# Patient Record
Sex: MALE | Race: WHITE | Employment: OTHER | RURAL
[De-identification: names, ages, dates, MRNs, and addresses within clinical notes are randomized per-mention and may not be internally consistent; named-entity substitution may affect disease eponyms.]

---

## 2017-03-15 ENCOUNTER — OFFICE VISIT (OUTPATIENT)
Dept: CARDIOLOGY CLINIC | Age: 77
End: 2017-03-15

## 2017-03-15 VITALS
BODY MASS INDEX: 24.73 KG/M2 | OXYGEN SATURATION: 99 % | SYSTOLIC BLOOD PRESSURE: 110 MMHG | WEIGHT: 167 LBS | HEIGHT: 69 IN | DIASTOLIC BLOOD PRESSURE: 58 MMHG | RESPIRATION RATE: 16 BRPM | HEART RATE: 73 BPM

## 2017-03-15 DIAGNOSIS — E78.5 DYSLIPIDEMIA: ICD-10-CM

## 2017-03-15 DIAGNOSIS — R55 SYNCOPE, UNSPECIFIED SYNCOPE TYPE: ICD-10-CM

## 2017-03-15 DIAGNOSIS — I48.0 PAROXYSMAL ATRIAL FIBRILLATION (HCC): Primary | ICD-10-CM

## 2017-03-15 DIAGNOSIS — I10 ESSENTIAL HYPERTENSION: ICD-10-CM

## 2017-03-15 PROBLEM — F03.90 DEMENTIA (HCC): Status: ACTIVE | Noted: 2017-03-15

## 2017-03-15 RX ORDER — MINERAL OIL
180 ENEMA (ML) RECTAL
COMMUNITY
End: 2019-01-01

## 2017-03-15 RX ORDER — MEMANTINE HYDROCHLORIDE 10 MG/1
TABLET ORAL 2 TIMES DAILY
COMMUNITY
End: 2018-04-14

## 2017-03-15 RX ORDER — RIVASTIGMINE 9.5 MG/24H
1 PATCH, EXTENDED RELEASE TRANSDERMAL DAILY
COMMUNITY
End: 2017-09-20 | Stop reason: ALTCHOICE

## 2017-03-15 RX ORDER — METOPROLOL TARTRATE 25 MG/1
12.5 TABLET, FILM COATED ORAL DAILY
COMMUNITY
End: 2019-01-01

## 2017-03-15 RX ORDER — MULTIVIT WITH MINERALS/HERBS
1 TABLET ORAL DAILY
COMMUNITY
End: 2019-01-01

## 2017-03-15 RX ORDER — TRIAMCINOLONE ACETONIDE 55 UG/1
1 SPRAY, METERED NASAL AS NEEDED
COMMUNITY
End: 2020-01-01

## 2017-03-15 RX ORDER — ATORVASTATIN CALCIUM 40 MG/1
40 TABLET, FILM COATED ORAL DAILY
COMMUNITY
End: 2019-01-01

## 2017-03-15 NOTE — MR AVS SNAPSHOT
Visit Information Date & Time Provider Department Dept. Phone Encounter #  
 3/15/2017  2:20 PM José Miguel Ibarra, [address] [facility] [address]  [phone] [id number] Follow-up Instructions Return in about 6 months (around 9/15/2017). Follow-up and Disposition History Upcoming Health Maintenance Date Due DTaP/Tdap/Td series (1 - Tdap) 5/4/1959 ZOSTER VACCINE AGE 60> 5/4/1998 GLAUCOMA SCREENING Q2Y 5/4/2003 Pneumococcal 65+ Low/Medium Risk (1 of 2 - PCV13) 5/4/2003 MEDICARE YEARLY EXAM 5/4/2003 INFLUENZA AGE 9 TO ADULT 8/1/2016 Allergies as of 3/15/2017  Review Complete On: 3/15/2017 By: José Miguel Ibarra MD  
 No Known Allergies Current Immunizations  Never Reviewed No immunizations on file. Not reviewed this visit You Were Diagnosed With   
  
 Codes Comments Paroxysmal atrial fibrillation (HCC)    -  Primary ICD-10-CM: I48.0 ICD-9-CM: 427.31 Essential hypertension     ICD-10-CM: I10 
ICD-9-CM: 401.9 Dyslipidemia     ICD-10-CM: E78.5 ICD-9-CM: 272.4 Syncope, unspecified syncope type     ICD-10-CM: R55 
ICD-9-CM: 780. 2 Vitals BP Pulse Resp Height(growth percentile) Weight(growth percentile) SpO2  
 110/58 (BP 1 Location: Right arm, BP Patient Position: Sitting) 73 16 5' 9\" (1.753 m) 167 lb (75.8 kg) 99% BMI Smoking Status 24.66 kg/m2 Former Smoker Vitals History BMI and BSA Data Body Mass Index Body Surface Area  
 24.66 kg/m 2 1.92 m 2 Preferred Pharmacy Pharmacy Name Phone [address], [address] AT [facility] [address] Gautam Enriquez [phone] Your Updated Medication List  
  
   
This list is accurate as of: 3/15/17  3:14 PM.  Always use your most recent med list.  
  
  
  
  
 b complex vitamins tablet Take 1 Tab by mouth daily. ELIQUIS 5 mg tablet Generic drug:  apixaban Take 5 mg by mouth two (2) times a day. EXELON 9.5 mg/24 hr patch Generic drug:  rivastigmine 1 Patch by TransDERmal route daily. fexofenadine 180 mg tablet Commonly known as:  Avonne Day Take  by mouth daily. FISH OIL PO Take  by mouth. Takes 1200mg daily LIPITOR 40 mg tablet Generic drug:  atorvastatin Take  by mouth daily. metoprolol tartrate 25 mg tablet Commonly known as:  LOPRESSOR Take  by mouth two (2) times a day. NAMENDA 10 mg tablet Generic drug:  memantine Take  by mouth two (2) times a day. NASACORT 55 mcg nasal inhaler Generic drug:  triamcinolone 1 Spray as needed. * OTHER Turmeric  1000mg. daily * OTHER  
L-Serine 500 mg. daily * Notice: This list has 2 medication(s) that are the same as other medications prescribed for you. Read the directions carefully, and ask your doctor or other care provider to review them with you. We Performed the Following AMB POC EKG ROUTINE W/ 12 LEADS, INTER & REP [51035 CPT(R)] Follow-up Instructions Return in about 6 months (around 9/15/2017). Introducing [facility]! [facility] introduces [employer] patient portal. Now you can access parts of your medical record, email your doctor's office, and request medication refills online. 1. In your internet browser, go to [email or web address] 2. Click on the First Time User? Click Here link in the Sign In box. You will see the New Member Sign Up page. 3. Enter your [employer] Access Code exactly as it appears below. You will not need to use this code after youve completed the sign-up process. If you do not sign up before the expiration date, you must request a new code. · [employer] Access Code: [email or web address] Expires: 6/13/2017  3:14 PM 
 
4. Enter the last four digits of your Social Security Number (xxxx) and Date of Birth (mm/dd/yyyy) as indicated and click Submit.  You will be taken to the next sign-up page. 5. Create a JumpPost ID. This will be your JumpPost login ID and cannot be changed, so think of one that is secure and easy to remember. 6. Create a JumpPost password. You can change your password at any time. 7. Enter your Password Reset Question and Answer. This can be used at a later time if you forget your password. 8. Enter your e-mail address. You will receive e-mail notification when new information is available in [address] 9. Click Sign Up. You can now view and download portions of your medical record. 10. Click the Download Summary menu link to download a portable copy of your medical information. If you have questions, please visit the Frequently Asked Questions section of the JumpPost website. Remember, JumpPost is NOT to be used for urgent needs. For medical emergencies, dial 911. Now available from your iPhone and Android! Please provide this summary of care documentation to your next provider. Your primary care clinician is listed as Daphne Olmstead. If you have any questions after today's visit, please call [phone].

## 2017-03-15 NOTE — PROGRESS NOTES
Katina Flores III is a 64 y.o. male is here for hospital f/u/new pt evaluation. Hx hypertension,dyslipidemia, mild dementia, admitted 2/10/17 to [facility] with syncope after using bathroom, weak shaky then recurrence walking back to bed--to ER. In afib/RVR and mildly dehydrated--controlled with IV to po Cardizem, metoprolol and gentle hydration, meds adjusted. Cardiac markers neg. Echo with LVEF 60-65, mild AV sclerosis/mild AI, mild MR. Doing well since d/c without recurrence. The patient denies chest pain/ shortness of breath, orthopnea, PND, LE edema, palpitations, syncope, presyncope or fatigue. Patient Active Problem List    Diagnosis Date Noted    PAF (paroxysmal atrial fibrillation) ([facility] [address] 03/15/2017    Syncope 03/15/2017    Essential hypertension 03/15/2017    Dyslipidemia 03/15/2017    Dementia 03/15/2017      Denita Breath  Past Medical History:   Diagnosis Date    Allergic rhinitis     Dementia     Dyslipidemia     HTN (hypertension)     Paroxysmal atrial fibrillation (HCC)     Syncope       History reviewed. No pertinent surgical history. No Known Allergies   Family History   Problem Relation Age of Onset    Parkinson's Disease Mother       Social History     Social History    Marital status:      Spouse name: N/A    Number of children: N/A    Years of education: N/A     Occupational History    Not on file. Social History Main Topics    Smoking status: Not on file    Smokeless tobacco: Not on file    Alcohol use Not on file    Drug use: Not on file    Sexual activity: Not on file     Other Topics Concern    Not on file     Social History Narrative    No narrative on file      No current outpatient prescriptions on file. No current facility-administered medications for this visit. Review of Symptoms:    CONST  No weight change.  No fever, chills, sweats    ENT No visual changes, URI sx, sore throat    CV  See HPI   RESP  No cough, or sputum, wheezing. Also see HPI   GI  No abdominal pain or change in bowel habits. No heartburn or dysphagia. No melena or rectal bleeding.   No dysuria, urgency, frequency, hematuria   MSKEL  No joint pain, swelling. No muscle pain. SKIN  No rash or lesions. NEURO  No headache, syncope, or seizure. No weakness, loss of sensation, or paresthesias. PSYCH  No low mood or depression  No anxiety. HE/LYMPH  No easy bruising, abnormal bleeding, or enlarged glands. Physical ExamPhysical Exam:    Visit Vitals    Resp 16    Ht 5' 9\" (1.753 m)    Wt 167 lb (75.8 kg)    BMI 24.66 kg/m2     Gen: NAD  HEENT:  PERRL, throat clear  Neck: no mass or thyromegaly, no JVD   Heart:  sl irregular,Nl S1S2,  no murmur, gallop or rub.   Lungs:  clear  Abdomen:   Soft, non-tender, bowel sounds are active.   Extremities:  No edema  Pulse: symmetric  Neuro: A&O times 3, WNL      Cardiographics    ECG: NSR, PVC's, LAE, PRWP      Labs:   No results found for: NA, K, CL, CO2, AGAP, GLU, BUN, CREA, BUCR, GFRAA, GFRNA, CA, TBIL, TBILI, GPT, SGOT, AP, TP, ALB, GLOB, AGRAT, ALT  No results found for: CPK, CPKX, CPX  No results found for: CHOL, CHOLX, CHLST, CHOLV, 036239, HDL, LDL, DLDL, LDLC, DLDLP, TGL, TGLX, TRIGL, [id number], TRIGP, CHHD, CHHDX  No results found for this or any previous visit. Assessment:         Patient Active Problem List    Diagnosis Date Noted    PAF (paroxysmal atrial fibrillation) ([facility] [address] 03/15/2017    Syncope 03/15/2017    Essential hypertension 03/15/2017    Dyslipidemia 03/15/2017    Dementia 03/15/2017      Hx hypertension,dyslipidemia, mild dementia, admitted 2/10/17 to [facility] with syncope after using bathroom, weak shaky then recurrence walking back to bed--to ER. In afib/RVR and mildly dehydrated--controlled with IV to po Cardizem, metoprolol and gentle hydration, meds adjusted. Cardiac markers neg. Echo with LVEF 60-65, mild AV sclerosis/mild AI, mild MR.   Doing well since d/c without recurrence. Plan:     Doing well with no adverse cardiac symptoms. Lipids and labs followed by PCP. Continue current care and f/u in 6 months.     Areli Galicia MD

## 2017-09-20 ENCOUNTER — OFFICE VISIT (OUTPATIENT)
Dept: CARDIOLOGY CLINIC | Age: 77
End: 2017-09-20

## 2017-09-20 VITALS
SYSTOLIC BLOOD PRESSURE: 102 MMHG | OXYGEN SATURATION: 99 % | HEIGHT: 69 IN | DIASTOLIC BLOOD PRESSURE: 60 MMHG | WEIGHT: 167 LBS | RESPIRATION RATE: 18 BRPM | BODY MASS INDEX: 24.73 KG/M2 | HEART RATE: 76 BPM

## 2017-09-20 DIAGNOSIS — I10 ESSENTIAL HYPERTENSION: ICD-10-CM

## 2017-09-20 DIAGNOSIS — I48.0 PAF (PAROXYSMAL ATRIAL FIBRILLATION) (HCC): Primary | ICD-10-CM

## 2017-09-20 RX ORDER — RIVASTIGMINE 13.3 MG/24H
1 PATCH, EXTENDED RELEASE TRANSDERMAL DAILY
COMMUNITY
End: 2020-01-01

## 2017-09-20 NOTE — PROGRESS NOTES
Verified patient with two patient identifiers. Medications reviewed/approved by Dr. Varinder Clark. Verbal from Dr. Varinder Clark to remove the medications that were deleted during the visit.

## 2017-09-20 NOTE — PROGRESS NOTES
Rober Arnold III is a 76 y.o. male is here for routine f/u. Over few weeks, some episodes of N/ feeling quesy, some palpitations  Have Fit-Bit and variable HR . They have moved to [place], still have house here. The patient denies chest pain/ shortness of breath, orthopnea, PND, LE edema,  syncope, presyncope or fatigue. Patient Active Problem List    Diagnosis Date Noted    PAF (paroxysmal atrial fibrillation) ([facility] [address] 03/15/2017    Syncope 03/15/2017    Essential hypertension 03/15/2017    Dyslipidemia 03/15/2017    Dementia 03/15/2017      Zaina Adams MD  Past Medical History:   Diagnosis Date    Allergic rhinitis     Dementia     Dyslipidemia     HTN (hypertension)     Paroxysmal atrial fibrillation (HCC)     Syncope       No past surgical history on file. No Known Allergies   Family History   Problem Relation Age of Onset    Parkinson's Disease Mother       Social History     Social History    Marital status:      Spouse name: N/A    Number of children: N/A    Years of education: N/A     Occupational History    Not on file. Social History Main Topics    Smoking status: Former Smoker     Quit date: 1/1/1963    Smokeless tobacco: Not on file    Alcohol use Not on file    Drug use: Not on file    Sexual activity: Not on file     Other Topics Concern    Not on file     Social History Narrative      Current Outpatient Prescriptions   Medication Sig    rivastigmine (EXELON) 13.3 mg/24 hour patch 1 Patch by TransDERmal route daily.  metoprolol tartrate (LOPRESSOR) 25 mg tablet Take  by mouth two (2) times a day.  apixaban (ELIQUIS) 5 mg tablet Take 5 mg by mouth two (2) times a day.  memantine (NAMENDA) 10 mg tablet Take  by mouth two (2) times a day.  atorvastatin (LIPITOR) 40 mg tablet Take  by mouth daily.  fexofenadine (ALLEGRA) 180 mg tablet Take  by mouth daily.  triamcinolone (NASACORT) 55 mcg nasal inhaler 1 Spray as needed.     b complex vitamins tablet Take 1 Tab by mouth daily.  DOCOSAHEXANOIC ACID/EPA (FISH OIL PO) Take  by mouth. Takes 1200mg daily    OTHER Turmeric  1000mg. daily    OTHER L-Serine 500 mg. daily     No current facility-administered medications for this visit. Review of Symptoms:    CONST  No weight change. No fever, chills, sweats    ENT No visual changes, URI sx, sore throat    CV  See HPI   RESP  No cough, or sputum, wheezing. Also see HPI   GI  No abdominal pain or change in bowel habits. No heartburn or dysphagia. No melena or rectal bleeding.   No dysuria, urgency, frequency, hematuria   MSKEL  No joint pain, swelling. No muscle pain. SKIN  No rash or lesions. NEURO  No headache, syncope, or seizure. No weakness, loss of sensation, or paresthesias. PSYCH  No low mood or depression  No anxiety. HE/LYMPH  No easy bruising, abnormal bleeding, or enlarged glands. Physical ExamPhysical Exam:    Visit Vitals    /60 (BP 1 Location: Right arm, BP Patient Position: Sitting)    Pulse 76    Resp 18    Ht 5' 9\" (1.753 m)    Wt 167 lb (75.8 kg)    SpO2 99%    BMI 24.66 kg/m2     Gen: NAD  HEENT:  PERRL, throat clear  Neck: no adenopathy, no thyromegaly, no JVD   Heart:  irregular,Nl S1S2,  no murmur, gallop or rub.   Lungs:  clear  Abdomen:   Soft, non-tender, bowel sounds are active.   Extremities:  No edema  Pulse: symmetric  Neuro: A&O times 3, No focal neuro deficits    Cardiographics    ECG: afib 102, no acute changes      Assessment:         Patient Active Problem List    Diagnosis Date Noted    PAF (paroxysmal atrial fibrillation) (HCC) 03/15/2017    Syncope 03/15/2017    Essential hypertension 03/15/2017    Dyslipidemia 03/15/2017    Dementia 03/15/2017     Over few weeks, some episodes of N/ feeling quesy, some palpitations  Have Fit-Bit and variable HR . They have moved to [facility], still have house here.      Plan:     Overall relatively stable, intermittent afib--rate controlled on anticoag. Lipids and labs followed by PCP. Continue current care and f/u in 6 months.     Gabe Ledezma MD

## 2018-04-14 ENCOUNTER — HOSPITAL ENCOUNTER (EMERGENCY)
Age: 78
Discharge: HOME | End: 2018-04-14
Attending: EMERGENCY MEDICINE
Payer: MEDICARE

## 2018-04-14 ENCOUNTER — APPOINTMENT (OUTPATIENT)
Dept: CT IMAGING | Age: 78
End: 2018-04-14
Attending: NURSE PRACTITIONER
Payer: MEDICARE

## 2018-04-14 ENCOUNTER — APPOINTMENT (OUTPATIENT)
Dept: GENERAL RADIOLOGY | Age: 78
End: 2018-04-14
Attending: NURSE PRACTITIONER
Payer: MEDICARE

## 2018-04-14 VITALS
HEIGHT: 70 IN | OXYGEN SATURATION: 100 % | BODY MASS INDEX: 24.48 KG/M2 | WEIGHT: 171 LBS | TEMPERATURE: 97.8 F | RESPIRATION RATE: 16 BRPM | HEART RATE: 97 BPM

## 2018-04-14 DIAGNOSIS — S01.01XA LACERATION OF SCALP, INITIAL ENCOUNTER: Primary | ICD-10-CM

## 2018-04-14 DIAGNOSIS — S20.211A CONTUSION OF RIB ON RIGHT SIDE, INITIAL ENCOUNTER: ICD-10-CM
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
## 2018-04-14 PROCEDURE — 70486 CT MAXILLOFACIAL W/O DYE: CPT

## 2018-04-14 PROCEDURE — 70450 CT HEAD/BRAIN W/O DYE: CPT

## 2018-04-14 PROCEDURE — 77030008460 HC STPLR SKN PRECIS 3M -A

## 2018-04-14 PROCEDURE — 74011000250 HC RX REV CODE- 250: Performed by: NURSE PRACTITIONER

## 2018-04-14 PROCEDURE — 71101 X-RAY EXAM UNILAT RIBS/CHEST: CPT

## 2018-04-14 PROCEDURE — 93005 ELECTROCARDIOGRAM TRACING: CPT

## 2018-04-14 PROCEDURE — 77030018836 HC SOL IRR NACL ICUM -A

## 2018-04-14 PROCEDURE — 90471 IMMUNIZATION ADMIN: CPT

## 2018-04-14 PROCEDURE — 74011250637 HC RX REV CODE- 250/637: Performed by: NURSE PRACTITIONER

## 2018-04-14 PROCEDURE — 75810000293 HC SIMP/SUPERF WND  RPR

## 2018-04-14 PROCEDURE — 72125 CT NECK SPINE W/O DYE: CPT

## 2018-04-14 PROCEDURE — 90715 TDAP VACCINE 7 YRS/> IM: CPT | Performed by: NURSE PRACTITIONER

## 2018-04-14 PROCEDURE — 74011250636 HC RX REV CODE- 250/636: Performed by: NURSE PRACTITIONER

## 2018-04-14 PROCEDURE — 99285 EMERGENCY DEPT VISIT HI MDM: CPT

## 2018-04-14 RX ORDER — ONDANSETRON 4 MG/1
4 TABLET, ORALLY DISINTEGRATING ORAL
Status: COMPLETED | OUTPATIENT
Start: 2018-04-14 | End: 2018-04-14

## 2018-04-14 RX ORDER — CLINDAMYCIN HYDROCHLORIDE 300 MG/1
300 CAPSULE ORAL 4 TIMES DAILY
Qty: 40 CAP | Refills: 0 | Status: SHIPPED | OUTPATIENT
Start: 2018-04-14 | End: 2018-04-24

## 2018-04-14 RX ORDER — LIDOCAINE HYDROCHLORIDE AND EPINEPHRINE 10; 10 MG/ML; UG/ML
20 INJECTION, SOLUTION INFILTRATION; PERINEURAL ONCE
Status: COMPLETED | OUTPATIENT
Start: 2018-04-14 | End: 2018-04-14

## 2018-04-14 RX ORDER — IBUPROFEN 800 MG/1
800 TABLET ORAL
Qty: 20 TAB | Refills: 0 | Status: SHIPPED | OUTPATIENT
Start: 2018-04-14 | End: 2018-04-21

## 2018-04-14 RX ORDER — HYDROCODONE BITARTRATE AND ACETAMINOPHEN 5; 325 MG/1; MG/1
1 TABLET ORAL
Status: COMPLETED | OUTPATIENT
Start: 2018-04-14 | End: 2018-04-14

## 2018-04-14 RX ADMIN — ONDANSETRON 4 MG: 4 TABLET, ORALLY DISINTEGRATING ORAL at 08:54

## 2018-04-14 RX ADMIN — HYDROCODONE BITARTRATE AND ACETAMINOPHEN 1 TABLET: 5; 325 TABLET ORAL at 09:59

## 2018-04-14 RX ADMIN — LIDOCAINE HYDROCHLORIDE AND EPINEPHRINE 200 MG: 10; 10 INJECTION, SOLUTION INFILTRATION; PERINEURAL at 11:09

## 2018-04-14 RX ADMIN — TETANUS TOXOID, REDUCED DIPHTHERIA TOXOID AND ACELLULAR PERTUSSIS VACCINE, ADSORBED 0.5 ML: 5; 2.5; 8; 8; 2.5 SUSPENSION INTRAMUSCULAR at 09:56

## 2018-04-14 NOTE — ED TRIAGE NOTES
Per EMS patient fell in the shower when the wife was trying to get him out of the bathroom. Per wife patient did not lose consciousness. Patient has dementia. Patient suffered laceration to his head. Periorbital bruising to left eye noted as well.

## 2018-04-14 NOTE — ED PROVIDER NOTES
HPI Comments: 8:25 AM Evelia Earl III is a 76 y.o. male with a hx of dementia, HTN, and a-fib who presents to the ED for evaluation of left sided facial pain that started PTA. The pt was feeling nauseated this morning, so he went to the bathroom. His wife heard a bang from the bathroom and came in to find her  on the floor. He hit his head on the bathtub. He did not lose consciousness per his wife. His last tetanus is unknown. He has dementia, but he lives at home with his wife who cares for him. He is on Eliquis. The pt has no complaints at this time. Hx limited due to patient dementia. The history is provided by the patient and the spouse. The history is limited by the condition of the patient (Pt has dementia). Past Medical History:   Diagnosis Date    Allergic rhinitis     Dementia     Dyslipidemia     HTN (hypertension)     Paroxysmal atrial fibrillation (HCC)     Syncope        No past surgical history on file. Family History:   Problem Relation Age of Onset    Parkinson's Disease Mother        Social History     Social History    Marital status:      Spouse name: N/A    Number of children: N/A    Years of education: N/A     Occupational History    Not on file. Social History Main Topics    Smoking status: Former Smoker     Quit date: 1/1/1963    Smokeless tobacco: Not on file    Alcohol use Not on file    Drug use: Not on file    Sexual activity: Not on file     Other Topics Concern    Not on file     Social History Narrative         ALLERGIES: Review of patient's allergies indicates no known allergies. Review of Systems   Unable to perform ROS: Dementia   Constitutional: Negative. HENT: Negative. Eyes: Negative. Respiratory: Negative. Cardiovascular: Negative. Gastrointestinal: Negative. Endocrine: Negative. Genitourinary: Negative. Musculoskeletal: Negative.          Right lateral rib pain     Skin: Positive for wound (Left scalp laceration). Allergic/Immunologic: Negative. Neurological: Negative. Hematological: Negative. Psychiatric/Behavioral: Negative. There were no vitals filed for this visit. Physical Exam   Constitutional: He is oriented to person, place, and time. He appears well-developed and well-nourished. No distress. HENT:   Head: Normocephalic and atraumatic. Eyes: Pupils are equal, round, and reactive to light. Neck: Normal range of motion. Neck supple. Cardiovascular: Normal rate, regular rhythm, normal heart sounds and intact distal pulses. Pulmonary/Chest: Effort normal and breath sounds normal. He has no wheezes. He has no rales. TTP on right lateral lower rib cage. No gross or palpable deformity. Abdominal: Soft. Bowel sounds are normal. There is no tenderness. There is no rebound and no guarding. Genitourinary:   Genitourinary Comments: NE   Musculoskeletal: Normal range of motion. Neurological: He is alert and oriented to person, place, and time. No cranial nerve deficit. Coordination normal.   Skin: Skin is warm and dry. There is a six cm laceration to the left parietal scalp. No FB. No bleeding. No tendon, ligament or nerve damage noted. Psychiatric: He has a normal mood and affect. Nursing note and vitals reviewed. MDM  Number of Diagnoses or Management Options  Contusion of rib on right side, initial encounter:   Fall, initial encounter:   Laceration of scalp, initial encounter:   Diagnosis management comments: MDM:  Will obtain CT scan of neck, brain and face. Will also image ribvs via plain film.   Milana Renteria NP  11:45 AM           Amount and/or Complexity of Data Reviewed  Tests in the radiology section of CPT®: ordered and reviewed          ED Course       Wound Closure by Adhesive  Date/Time: 4/14/2018 11:45 AM  Performed by: Martha Del Toro  Authorized by: Martha Del Toro     Consent:     Consent obtained:  Verbal and emergent situation    Consent given by:  Patient and spouse    Risks discussed:  Pain  Anesthesia (see MAR for exact dosages): Anesthesia method:  Local infiltration    Local anesthetic:  Lidocaine 1% WITH epi  Laceration details:     Location:  Scalp    Length (cm):  6    Depth (mm):  4  Repair type:     Repair type:  Simple  Pre-procedure details:     Preparation:  Patient was prepped and draped in usual sterile fashion  Exploration:     Wound exploration: entire depth of wound probed and visualized      Wound extent: no foreign bodies/material noted      Contaminated: no    Treatment:     Area cleansed with:  Betadine    Amount of cleaning:  Standard    Irrigation solution:  Sterile saline    Irrigation method:  Syringe  Skin repair:     Repair method:  Staples    Number of staples:  6  Approximation:     Approximation:  Close    Vermilion border: well-aligned    Post-procedure details:     Dressing:  Sterile dressing               SCRIBE ATTESTATION STATEMENT  Documented by: Linnea loganibing for, and in the presence of, Gillermo Meigs, NP 8:35 AM     Signed by: Keya Patton, 04/14/18 8:35 AM     PROVIDER ATTESTATION STATEMENT  I personally performed the services described in the documentation, reviewed the documentation, as recorded by the scribe in my presence, and it accurately and completely records my words and actions. Gillermo Meigs, NP    Diagnosis:   1. Laceration of scalp, initial encounter    2. Contusion of rib on right side, initial encounter    3. Fall, initial encounter          Disposition:   Discharged to Home. Follow-up Information     Follow up With Details Comments Contact Info    Meme Jackson MD Call on Monday for a follow up appointment. [facility]  [phone]            Patient's Medications   Start Taking    No medications on file   Continue Taking    APIXABAN (ELIQUIS) 5 MG TABLET    Take 5 mg by mouth two (2) times a day.     ATORVASTATIN (LIPITOR) 40 MG TABLET    Take  by mouth daily. B COMPLEX VITAMINS TABLET    Take 1 Tab by mouth daily. DOCOSAHEXANOIC ACID/EPA (FISH OIL PO)    Take  by mouth. Takes 1200mg daily    FEXOFENADINE (ALLEGRA) 180 MG TABLET    Take  by mouth daily. METOPROLOL TARTRATE (LOPRESSOR) 25 MG TABLET    Take  by mouth two (2) times a day. OTHER    Turmeric  1000mg. daily    OTHER    L-Serine 500 mg. daily    RIVASTIGMINE (EXELON) 13.3 MG/24 HOUR PATCH    1 Patch by TransDERmal route daily. TRIAMCINOLONE (NASACORT) 55 MCG NASAL INHALER    1 Spray as needed. These Medications have changed    No medications on file   Stop Taking    MEMANTINE (NAMENDA) 10 MG TABLET    Take  by mouth two (2) times a day.

## 2018-04-14 NOTE — DISCHARGE INSTRUCTIONS
Chest Contusion: Care Instructions  Your Care Instructions  A chest contusion, or bruise, is caused by a fall or direct blow to the chest. Car crashes, falls, getting punched, and injury from bicycle handlebars are common causes of chest contusions. A very forceful blow to the chest can injure the heart or blood vessels in the chest, the lungs, the airway, the liver, or the spleen. Pain may be caused by an injury to muscles, cartilage, or ribs. Deep breathing, coughing, or sneezing can increase your pain. Lying on the injured area also can cause pain. Follow-up care is a key part of your treatment and safety. Be sure to make and go to all appointments, and call your doctor if you are having problems. It's also a good idea to know your test results and keep a list of the medicines you take. How can you care for yourself at home? · Rest and protect the injured or sore area. Stop, change, or take a break from any activity that may be causing your pain. · Put ice or a cold pack on the area for 10 to 20 minutes at a time. Put a thin cloth between the ice and your skin. · After 2 or 3 days, if your swelling is gone, apply a heating pad set on low or a warm cloth to your chest. Some doctors suggest that you go back and forth between hot and cold. Put a thin cloth between the heating pad and your skin. · Do not wrap or tape your ribs for support. This may cause you to take smaller breaths, which could increase your risk of pneumonia and lung collapse. · Ask your doctor if you can take an over-the-counter pain medicine, such as acetaminophen (Tylenol), ibuprofen (Advil, Motrin), or naproxen (Aleve). Be safe with medicines. Read and follow all instructions on the label. · Take your medicines exactly as prescribed. Call your doctor if you think you are having a problem with your medicine.   · Gentle stretching and massage may help you feel better after a few days of rest. Stretch slowly to the point just before discomfort begins, then hold the stretch for at least 15 to 30 seconds. Do this 3 or 4 times per day. · As your pain gets better, slowly return to your normal activities. Be patient, because chest bruises can take weeks or months to heal. Any increased pain may be a sign that you need to rest a while longer. When should you call for help? Call 911 anytime you think you may need emergency care. For example, call if:  ? · You have severe trouble breathing. ? · You cough up blood. ?Call your doctor now or seek immediate medical care if:  ? · You have belly pain. ? · You are dizzy or lightheaded, or you feel like you may faint. ? · You develop new symptoms with the chest pain. ? · Your chest pain gets worse. ? · You have a fever. ? · You have some shortness of breath. ? · You have a cough that brings up mucus from the lungs. ? Watch closely for changes in your health, and be sure to contact your doctor if:  ? · Your chest pain is not improving after 1 week. Where can you learn more? Go to http://mehul-donavan.info/. Enter I174 in the search box to learn more about \"Chest Contusion: Care Instructions. \"  Current as of: March 20, 2017  Content Version: 11.4  © 3861-9453 [employer]. Care instructions adapted under license by [employer] (which disclaims liability or warranty for this information). If you have questions about a medical condition or this instruction, always ask your healthcare professional. [place] [address] any warranty or liability for your use of this information. Audible Magic Activation    Thank you for requesting access to Audible Magic. Please follow the instructions below to securely access and download your online medical record. Audible Magic allows you to send messages to your doctor, view your test results, renew your prescriptions, schedule appointments, and more. How Do I Sign Up? 1.  In your internet browser, go to [email or web address]  2. Click on the First Time User? Click Here link in the Sign In box. You will be redirect to the New Member Sign Up page. 3. Enter your iClinical Access Code exactly as it appears below. You will not need to use this code after youve completed the sign-up process. If you do not sign up before the expiration date, you must request a new code. iClinical Access Code: [email or web address]  Expires: 2018  8:19 AM (This is the date your iClinical access code will )    4. Enter the last four digits of your Social Security Number (xxxx) and Date of Birth (mm/dd/yyyy) as indicated and click Submit. You will be taken to the next sign-up page. 5. Create a Alliquat ID. This will be your iClinical login ID and cannot be changed, so think of one that is secure and easy to remember. 6. Create a iClinical password. You can change your password at any time. 7. Enter your Password Reset Question and Answer. This can be used at a later time if you forget your password. 8. Enter your e-mail address. You will receive e-mail notification when new information is available in [address] 9. Click Sign Up. You can now view and download portions of your medical record. 10. Click the Download Summary menu link to download a portable copy of your medical information. Additional Information    If you have questions, please visit the Frequently Asked Questions section of the iClinical website at [email or web address] Remember, iClinical is NOT to be used for urgent needs. For medical emergencies, dial 911. Recommend wound check in 48 hours. Staples to come out in 10 days. Return to the ER at once if symptoms worsen. May take over the counter Tylenol as needed for pain. I have reviewed discharge instructions with the patient. The patient verbalized understanding.

## 2018-04-15 LAB
ATRIAL RATE: 375 BPM
CALCULATED R AXIS, ECG10: -34 DEGREES
CALCULATED T AXIS, ECG11: -28 DEGREES
DIAGNOSIS, 93000: NORMAL
Q-T INTERVAL, ECG07: 414 MS
QRS DURATION, ECG06: 94 MS
QTC CALCULATION (BEZET), ECG08: 468 MS
VENTRICULAR RATE, ECG03: 77 BPM

## 2018-08-24 ENCOUNTER — HOSPITAL ENCOUNTER (EMERGENCY)
Age: 78
Discharge: HOME | End: 2018-08-24
Attending: EMERGENCY MEDICINE
Payer: MEDICARE

## 2018-08-24 ENCOUNTER — APPOINTMENT (OUTPATIENT)
Dept: CT IMAGING | Age: 78
End: 2018-08-24
Attending: EMERGENCY MEDICINE
Payer: MEDICARE

## 2018-08-24 VITALS
HEART RATE: 66 BPM | BODY MASS INDEX: 24.77 KG/M2 | OXYGEN SATURATION: 100 % | DIASTOLIC BLOOD PRESSURE: 87 MMHG | RESPIRATION RATE: 12 BRPM | HEIGHT: 70 IN | WEIGHT: 173 LBS | SYSTOLIC BLOOD PRESSURE: 149 MMHG | TEMPERATURE: 98.4 F

## 2018-08-24 DIAGNOSIS — R55 NEAR SYNCOPE: Primary | ICD-10-CM

## 2018-08-24 LAB
ALBUMIN SERPL-MCNC: 3.8 G/DL (ref 3.4–5)
ALBUMIN/GLOB SERPL: 1.1 {RATIO} (ref 0.8–1.7)
ALP SERPL-CCNC: 67 U/L (ref 45–117)
ALT SERPL-CCNC: 28 U/L (ref 16–61)
ANION GAP SERPL CALC-SCNC: 7 MMOL/L (ref 3–18)
APTT PPP: 32.6 SEC (ref 23–36.4)
AST SERPL-CCNC: 20 U/L (ref 15–37)
BASOPHILS # BLD: 0 K/UL (ref 0–0.1)
BASOPHILS NFR BLD: 0 % (ref 0–2)
BILIRUB SERPL-MCNC: 0.8 MG/DL (ref 0.2–1)
BUN SERPL-MCNC: 24 MG/DL (ref 7–18)
BUN/CREAT SERPL: 22 (ref 12–20)
CALCIUM SERPL-MCNC: 8.8 MG/DL (ref 8.5–10.1)
CHLORIDE SERPL-SCNC: 106 MMOL/L (ref 100–108)
CO2 SERPL-SCNC: 27 MMOL/L (ref 21–32)
CREAT SERPL-MCNC: 1.09 MG/DL (ref 0.6–1.3)
DIFFERENTIAL METHOD BLD: ABNORMAL
EOSINOPHIL # BLD: 0.2 K/UL (ref 0–0.4)
EOSINOPHIL NFR BLD: 2 % (ref 0–5)
ERYTHROCYTE [DISTWIDTH] IN BLOOD BY AUTOMATED COUNT: 12.8 % (ref 11.6–14.5)
GLOBULIN SER CALC-MCNC: 3.6 G/DL (ref 2–4)
GLUCOSE SERPL-MCNC: 133 MG/DL (ref 74–99)
HCT VFR BLD AUTO: 39.9 % (ref 36–48)
HGB BLD-MCNC: 13.4 G/DL (ref 13–16)
INR PPP: 1.2 (ref 0.8–1.2)
LYMPHOCYTES # BLD: 1.4 K/UL (ref 0.9–3.6)
LYMPHOCYTES NFR BLD: 19 % (ref 21–52)
MCH RBC QN AUTO: 32.6 PG (ref 24–34)
MCHC RBC AUTO-ENTMCNC: 33.6 G/DL (ref 31–37)
MCV RBC AUTO: 97.1 FL (ref 74–97)
MONOCYTES # BLD: 0.5 K/UL (ref 0.05–1.2)
MONOCYTES NFR BLD: 7 % (ref 3–10)
NEUTS SEG # BLD: 5.1 K/UL (ref 1.8–8)
NEUTS SEG NFR BLD: 72 % (ref 40–73)
PLATELET # BLD AUTO: 238 K/UL (ref 135–420)
PMV BLD AUTO: 10.3 FL (ref 9.2–11.8)
POTASSIUM SERPL-SCNC: 4.4 MMOL/L (ref 3.5–5.5)
PROT SERPL-MCNC: 7.4 G/DL (ref 6.4–8.2)
PROTHROMBIN TIME: 14.8 SEC (ref 11.5–15.2)
RBC # BLD AUTO: 4.11 M/UL (ref 4.7–5.5)
SODIUM SERPL-SCNC: 140 MMOL/L (ref 136–145)
WBC # BLD AUTO: 7.2 K/UL (ref 4.6–13.2)

## 2018-08-24 PROCEDURE — 85730 THROMBOPLASTIN TIME PARTIAL: CPT | Performed by: EMERGENCY MEDICINE

## 2018-08-24 PROCEDURE — 93005 ELECTROCARDIOGRAM TRACING: CPT

## 2018-08-24 PROCEDURE — 51798 US URINE CAPACITY MEASURE: CPT

## 2018-08-24 PROCEDURE — 99285 EMERGENCY DEPT VISIT HI MDM: CPT

## 2018-08-24 PROCEDURE — 85610 PROTHROMBIN TIME: CPT | Performed by: EMERGENCY MEDICINE

## 2018-08-24 PROCEDURE — 80053 COMPREHEN METABOLIC PANEL: CPT | Performed by: EMERGENCY MEDICINE

## 2018-08-24 PROCEDURE — 70450 CT HEAD/BRAIN W/O DYE: CPT

## 2018-08-24 PROCEDURE — 85025 COMPLETE CBC W/AUTO DIFF WBC: CPT | Performed by: EMERGENCY MEDICINE

## 2018-08-24 NOTE — DISCHARGE INSTRUCTIONS
Lightheadedness or Faintness: Care Instructions  Your Care Instructions  Lightheadedness is a feeling that you are about to faint or \"pass out. \" You do not feel as if you or your surroundings are moving. It is different from vertigo, which is the feeling that you or things around you are spinning or tilting. Lightheadedness usually goes away or gets better when you lie down. If lightheadedness gets worse, it can lead to a fainting spell. It is common to feel lightheaded from time to time. Lightheadedness usually is not caused by a serious problem. It often is caused by a short-lasting drop in blood pressure and blood flow to your head that occurs when you get up too quickly from a seated or lying position. Follow-up care is a key part of your treatment and safety. Be sure to make and go to all appointments, and call your doctor if you are having problems. It's also a good idea to know your test results and keep a list of the medicines you take. How can you care for yourself at home? · Lie down for 1 or 2 minutes when you feel lightheaded. After lying down, sit up slowly and remain sitting for 1 to 2 minutes before slowly standing up. · Avoid movements, positions, or activities that have made you lightheaded in the past.  · Get plenty of rest, especially if you have a cold or flu, which can cause lightheadedness. · Make sure you drink plenty of fluids, especially if you have a fever or have been sweating. · Do not drive or put yourself and others in danger while you feel lightheaded. When should you call for help? Call 911 anytime you think you may need emergency care. For example, call if:    · You have symptoms of a stroke. These may include:  ¨ Sudden numbness, tingling, weakness, or loss of movement in your face, arm, or leg, especially on only one side of your body. ¨ Sudden vision changes. ¨ Sudden trouble speaking. ¨ Sudden confusion or trouble understanding simple statements.   ¨ Sudden problems with walking or balance. ¨ A sudden, severe headache that is different from past headaches.     · You have symptoms of a heart attack. These may include:  ¨ Chest pain or pressure, or a strange feeling in the chest.  ¨ Sweating. ¨ Shortness of breath. ¨ Nausea or vomiting. ¨ Pain, pressure, or a strange feeling in the back, neck, jaw, or upper belly or in one or both shoulders or arms. ¨ Lightheadedness or sudden weakness. ¨ A fast or irregular heartbeat. After you call 911, the  may tell you to chew 1 adult-strength or 2 to 4 low-dose aspirin. Wait for an ambulance. Do not try to drive yourself.    Watch closely for changes in your health, and be sure to contact your doctor if:    · Your lightheadedness gets worse or does not get better with home care. Where can you learn more? Go to http://mehul-donavan.info/. Enter D065 in the search box to learn more about \"Lightheadedness or Faintness: Care Instructions. \"  Current as of: November 20, 2017  Content Version: 11.7  © 1576-6424 [employer]. Care instructions adapted under license by [employer] (which disclaims liability or warranty for this information). If you have questions about a medical condition or this instruction, always ask your healthcare professional. [address] any warranty or liability for your use of this information. I have reviewed discharge instructions with the patient. The patient verbalized understanding.

## 2018-08-24 NOTE — ED PROVIDER NOTES
EMERGENCY DEPARTMENT HISTORY AND PHYSICAL EXAM    2:46 PM      Date: 8/24/2018  Patient Name: Madeline Juarez III    History of Presenting Illness     Chief Complaint   Patient presents with    Dizziness         History Provided By: Patient, Patient's Wife and EMS    Chief Complaint: weakness, generalized  Duration:  Minutes  Timing:  Acute and Improving  Location: Generalized  Quality: Weakness  Severity: Moderate  Modifying Factors: None  Associated Symptoms: feeling woozy, eyes rolling back in his head      Additional History (Context): Madeline Juarez III is a [de-identified] y.o. male with HTN, dyslipidemia, a-fib, and dementia who presents for evaluation of a transient episode of weakness that occurred several minutes PTA. Per his wife, the pt was in the activity room at his assisted living facility when he started to feel woozy. She decided it may be best to bring him back to his room and lay him down; however, they were unable to get him to stand up due to weakness. His eyes began rolling back in his head and they were unable to get him to squeeze his hands. This episode only lasted a short time and had resolved before EMS arrival. The pt is feeling fine at this time. Per his wife, the pt has had similar episodes in the past that have been followed by nausea with his last episode two weeks ago. He did not have nausea following this episode. The pt and his wife deny syncope, chest pain , SOB, and any further complaints. PCP: Francesco Chris MD    Current Outpatient Prescriptions   Medication Sig Dispense Refill    rivastigmine (EXELON) 13.3 mg/24 hour patch 1 Patch by TransDERmal route daily.  metoprolol tartrate (LOPRESSOR) 25 mg tablet Take 12.5 mg by mouth daily.  apixaban (ELIQUIS) 5 mg tablet Take 5 mg by mouth two (2) times a day.  atorvastatin (LIPITOR) 40 mg tablet Take  by mouth daily.  fexofenadine (ALLEGRA) 180 mg tablet Take  by mouth daily.       triamcinolone (NASACORT) 55 mcg nasal inhaler 1 Spray as needed.  b complex vitamins tablet Take 1 Tab by mouth daily.  DOCOSAHEXANOIC ACID/EPA (FISH OIL PO) Take  by mouth. Takes 1200mg daily      OTHER Turmeric  1000mg. daily      OTHER L-Serine 500 mg. daily         Past History     Past Medical History:  Past Medical History:   Diagnosis Date    Allergic rhinitis     Dementia     Dyslipidemia     HTN (hypertension)     Paroxysmal atrial fibrillation (HCC)     Syncope        Past Surgical History:  History reviewed. No pertinent surgical history. Family History:  Family History   Problem Relation Age of Onset    Parkinson's Disease Mother        Social History:  Social History   Substance Use Topics    Smoking status: Former Smoker     Quit date: 1/1/1963    Smokeless tobacco: Never Used    Alcohol use Yes       Allergies:  No Known Allergies      Review of Systems     Review of Systems   Constitutional: Negative for diaphoresis and fever. Positive for feeling woozy   HENT: Negative for congestion and sore throat. Eyes: Negative for pain and itching. Respiratory: Negative for cough and shortness of breath. Cardiovascular: Negative for chest pain and palpitations. Gastrointestinal: Negative for abdominal pain and diarrhea. Endocrine: Negative for polydipsia and polyuria. Genitourinary: Negative for dysuria and hematuria. Musculoskeletal: Negative for arthralgias and myalgias. Skin: Negative for rash and wound. Neurological: Positive for weakness. Negative for seizures and syncope. Hematological: Does not bruise/bleed easily. Psychiatric/Behavioral: Negative for agitation and hallucinations. Physical Exam     Visit Vitals    /71    Pulse 67    Temp 98.4 °F (36.9 °C)    Resp 22    Ht 5' 10\" (1.778 m)    Wt 78.5 kg (173 lb)    SpO2 99%    BMI 24.82 kg/m2       Physical Exam   Constitutional: He appears well-developed and well-nourished.    HENT:   Head: Normocephalic and atraumatic. Eyes: Conjunctivae are normal. No scleral icterus. Neck: Normal range of motion. Neck supple. No JVD present. Cardiovascular: Normal rate, regular rhythm and normal heart sounds. 4 intact extremity pulses   Pulmonary/Chest: Effort normal and breath sounds normal.   Abdominal: Soft. He exhibits no mass. There is no tenderness. Musculoskeletal: Normal range of motion. Lymphadenopathy:     He has no cervical adenopathy. Neurological: He is alert. He has normal strength. No cranial nerve deficit or sensory deficit. Skin: Skin is warm and dry. Nursing note and vitals reviewed. Diagnostic Study Results     Labs -  Recent Results (from the past 12 hour(s))   CBC WITH AUTOMATED DIFF    Collection Time: 08/24/18  2:55 PM   Result Value Ref Range    WBC 7.2 4.6 - 13.2 K/uL    RBC 4.11 (L) 4.70 - 5.50 M/uL    HGB 13.4 13.0 - 16.0 g/dL    HCT 39.9 36.0 - 48.0 %    MCV 97.1 (H) 74.0 - 97.0 FL    MCH 32.6 24.0 - 34.0 PG    MCHC 33.6 31.0 - 37.0 g/dL    RDW 12.8 11.6 - 14.5 %    PLATELET 527 924 - 740 K/uL    MPV 10.3 9.2 - 11.8 FL    NEUTROPHILS 72 40 - 73 %    LYMPHOCYTES 19 (L) 21 - 52 %    MONOCYTES 7 3 - 10 %    EOSINOPHILS 2 0 - 5 %    BASOPHILS 0 0 - 2 %    ABS. NEUTROPHILS 5.1 1.8 - 8.0 K/UL    ABS. LYMPHOCYTES 1.4 0.9 - 3.6 K/UL    ABS. MONOCYTES 0.5 0.05 - 1.2 K/UL    ABS. EOSINOPHILS 0.2 0.0 - 0.4 K/UL    ABS.  BASOPHILS 0.0 0.0 - 0.1 K/UL    DF AUTOMATED     PROTHROMBIN TIME + INR    Collection Time: 08/24/18  2:55 PM   Result Value Ref Range    Prothrombin time 14.8 11.5 - 15.2 sec    INR 1.2 0.8 - 1.2     METABOLIC PANEL, COMPREHENSIVE    Collection Time: 08/24/18  2:55 PM   Result Value Ref Range    Sodium 140 136 - 145 mmol/L    Potassium 4.4 3.5 - 5.5 mmol/L    Chloride 106 100 - 108 mmol/L    CO2 27 21 - 32 mmol/L    Anion gap 7 3.0 - 18 mmol/L    Glucose 133 (H) 74 - 99 mg/dL    BUN 24 (H) 7.0 - 18 MG/DL    Creatinine 1.09 0.6 - 1.3 MG/DL    BUN/Creatinine ratio 22 (H) 12 - 20      GFR est AA >60 >60 ml/min/1.73m2    GFR est non-AA >60 >60 ml/min/1.73m2    Calcium 8.8 8.5 - 10.1 MG/DL    Bilirubin, total 0.8 0.2 - 1.0 MG/DL    ALT (SGPT) 28 16 - 61 U/L    AST (SGOT) 20 15 - 37 U/L    Alk. phosphatase 67 45 - 117 U/L    Protein, total 7.4 6.4 - 8.2 g/dL    Albumin 3.8 3.4 - 5.0 g/dL    Globulin 3.6 2.0 - 4.0 g/dL    A-G Ratio 1.1 0.8 - 1.7     PTT    Collection Time: 08/24/18  2:55 PM   Result Value Ref Range    aPTT 32.6 23.0 - 36.4 SEC   EKG, 12 LEAD, INITIAL    Collection Time: 08/24/18  3:22 PM   Result Value Ref Range    Ventricular Rate 65 BPM    Atrial Rate 67 BPM    QRS Duration 98 ms    Q-T Interval 440 ms    QTC Calculation (Bezet) 457 ms    Calculated R Axis -35 degrees    Calculated T Axis 3 degrees    Diagnosis       Atrial fibrillation  Left axis deviation  Incomplete right bundle branch block  Possible Anteroseptal infarct (cited on or before 14-APR-2018)  Abnormal ECG  When compared with ECG of 14-APR-2018 08:51,  Criteria for Inferior infarct are no longer present         Radiologic Studies -   CT HEAD WO CONT   Final Result   IMPRESSION:     No acute abnormality.  There is no evidence of hemorrhage, mass effect or acute  infarction          Medical Decision Making   I am the first provider for this patient. I reviewed the vital signs, available nursing notes, past medical history, past surgical history, family history and social history. Vital Signs-Reviewed the patient's vital signs. Pulse Oximetry Analysis -  97% on room air (Interpretation)WNL    Cardiac Monitor:  Rate: 57 BPM  Rhythm:  Atrial Fibrillation     EKG: Interpreted by the EP. Time Interpreted: 1522   Rate: 65 BPM   Rhythm: Atrial Fibrillation    Interpretation:No acute process   Comparison: Unchanged from prior    Repeat  EKG: Interpreted by the EP.    Time Interpreted: 1545   Rate: 65 BPM   Rhythm: Atrial Fibrillation    Interpretation:No acute process   Comparison: Unchanged from prior    Records Reviewed: Nursing Notes and Old Medical Records (Time of Review: 2:46 PM)    ED Course: Progress Notes, Reevaluation, and Consults:  Consult:  Discussed care with Dr. Bandar Layton, Specialty: Tele-Neurology  Standard discussion; including history of patients chief complaint, available diagnostic results, and treatment course. He agrees that the story is not consistent with stroke or TIA. Proceed with work-up for causes of near syncope. 3:17 PM, 08/24/18     3:56 PM Patient reassessed    4:47 PM Pt reevaluated at this time. Discussed bradycardia may causing these brief episodes of presyncope with nausea. He has been evaluated by cardiology before. Gave option of inpatient vs discharge. They would prefer to be discharged. Discussed results and findings, as well as, diagnosis and plan for discharge. Follow up with doctors/services listed. Return to the emergency department for alarming symptoms. Pt verbalizes understanding and agreement with plan. All questions addressed. Provider Notes (Medical Decision Making): After talking to wife, sounds more consistent with near syncopal episode, very short lived. Don't suspect cardiac arrhythmia, TIA, or ischemic stroke. Diagnosis     Clinical Impression:   1. Near syncope        Disposition: Discharge    Follow-up Information     Follow up With Details Comments Rocael Yoon MD   [address] [place] [address]  [phone]             Patient's Medications   Start Taking    No medications on file   Continue Taking    APIXABAN (ELIQUIS) 5 MG TABLET    Take 5 mg by mouth two (2) times a day. ATORVASTATIN (LIPITOR) 40 MG TABLET    Take  by mouth daily. B COMPLEX VITAMINS TABLET    Take 1 Tab by mouth daily. DOCOSAHEXANOIC ACID/EPA (FISH OIL PO)    Take  by mouth. Takes 1200mg daily    FEXOFENADINE (ALLEGRA) 180 MG TABLET    Take  by mouth daily.     METOPROLOL TARTRATE (LOPRESSOR) 25 MG TABLET    Take 12.5 mg by mouth daily. OTHER    Turmeric  1000mg. daily    OTHER    L-Serine 500 mg. daily    RIVASTIGMINE (EXELON) 13.3 MG/24 HOUR PATCH    1 Patch by TransDERmal route daily. TRIAMCINOLONE (NASACORT) 55 MCG NASAL INHALER    1 Spray as needed. These Medications have changed    No medications on file   Stop Taking    No medications on file     _______________________________    Attestations:  [place] [address] [place] [address] acting as a scribe for and in the presence of Stephanie Smith MD      August 24, 2018 at 4:53 PM       Provider Attestation:      I personally performed the services described in the documentation, reviewed the documentation, as recorded by the scribe in my presence, and it accurately and completely records my words and actions.  August 24, 2018 at 4:53 PM - Stephanie Smith MD    _______________________________

## 2018-08-24 NOTE — ED NOTES
Patient attempts to urinate. Stands at bedside. Cardiac monitoring during does not reveal changes in rate or quality.

## 2018-08-24 NOTE — ED TRIAGE NOTES
Presents from assisted living facility with complaint of dizziness and a brief episode of slurred speech. No symptoms on arrival.  Hx of alzheimer's and dementia.

## 2018-08-25 LAB
ATRIAL RATE: 67 BPM
ATRIAL RATE: 69 BPM
CALCULATED R AXIS, ECG10: -31 DEGREES
CALCULATED R AXIS, ECG10: -35 DEGREES
CALCULATED T AXIS, ECG11: 3 DEGREES
CALCULATED T AXIS, ECG11: 4 DEGREES
DIAGNOSIS, 93000: NORMAL
DIAGNOSIS, 93000: NORMAL
Q-T INTERVAL, ECG07: 440 MS
Q-T INTERVAL, ECG07: 464 MS
QRS DURATION, ECG06: 98 MS
QRS DURATION, ECG06: 98 MS
QTC CALCULATION (BEZET), ECG08: 457 MS
QTC CALCULATION (BEZET), ECG08: 482 MS
VENTRICULAR RATE, ECG03: 65 BPM
VENTRICULAR RATE, ECG03: 65 BPM

## 2018-09-11 ENCOUNTER — APPOINTMENT (OUTPATIENT)
Dept: CT IMAGING | Age: 78
End: 2018-09-11
Attending: EMERGENCY MEDICINE
Payer: MEDICARE

## 2018-09-11 ENCOUNTER — HOSPITAL ENCOUNTER (EMERGENCY)
Age: 78
Discharge: HOME | End: 2018-09-11
Attending: EMERGENCY MEDICINE
Payer: MEDICARE

## 2018-09-11 VITALS
TEMPERATURE: 98.1 F | HEART RATE: 73 BPM | WEIGHT: 177 LBS | OXYGEN SATURATION: 99 % | RESPIRATION RATE: 16 BRPM | DIASTOLIC BLOOD PRESSURE: 67 MMHG | BODY MASS INDEX: 25.4 KG/M2 | SYSTOLIC BLOOD PRESSURE: 111 MMHG
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
## 2018-09-11 DIAGNOSIS — S09.90XA CLOSED HEAD INJURY, INITIAL ENCOUNTER: ICD-10-CM
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
## 2018-09-11 LAB
APPEARANCE UR: CLEAR
BACTERIA URNS QL MICRO: NEGATIVE /HPF
BILIRUB UR QL: NEGATIVE
COLOR UR: YELLOW
EPITH CASTS URNS QL MICRO: NORMAL /LPF (ref 0–5)
GLUCOSE UR STRIP.AUTO-MCNC: NEGATIVE MG/DL
HGB UR QL STRIP: NEGATIVE
KETONES UR QL STRIP.AUTO: NEGATIVE MG/DL
LEUKOCYTE ESTERASE UR QL STRIP.AUTO: NEGATIVE
NITRITE UR QL STRIP.AUTO: NEGATIVE
PH UR STRIP: 7 [PH] (ref 5–8)
PROT UR STRIP-MCNC: 30 MG/DL
RBC #/AREA URNS HPF: NORMAL /HPF (ref 0–5)
SP GR UR REFRACTOMETRY: 1.02 (ref 1–1.03)
UROBILINOGEN UR QL STRIP.AUTO: 0.2 EU/DL (ref 0.2–1)
WBC URNS QL MICRO: NORMAL /HPF (ref 0–4)

## 2018-09-11 PROCEDURE — 99285 EMERGENCY DEPT VISIT HI MDM: CPT

## 2018-09-11 PROCEDURE — 70450 CT HEAD/BRAIN W/O DYE: CPT

## 2018-09-11 PROCEDURE — 81001 URINALYSIS AUTO W/SCOPE: CPT | Performed by: EMERGENCY MEDICINE

## 2018-09-11 NOTE — ED TRIAGE NOTES
Pt arrived by EMS from [facility] with c/o fall when walking to bathroom. LAst seen prior at 4 am. PAtient denies any pain.  PAtient has DNR paperwork given to  upon arrival

## 2018-09-11 NOTE — ED PROVIDER NOTES
EMERGENCY DEPARTMENT HISTORY AND PHYSICAL EXAM 
 
7:02 AM 
 
 
Date: 9/11/2018 Patient Name: Neita Lefort III History of Presenting Illness Chief Complaint Patient presents with  Fall History Provided By: Patient and Patient's Wife Chief Complaint: Santhosh Kelly Duration: This morning Timing:  Acute Location: Reports left-sided headache to his wife Quality: N/A Severity: Patient denies having any pain. Modifying Factors: None Associated Symptoms: Patient reports left-sides headache to the wife, but currently denies having any pain. Additional History (Context): Neita Lefort III is a [de-identified] y.o. male with PMHx of HTN, Dyslipidemia, Syncope, Paroxysmal Atrial Fibrillation, Dementia, Allergic Rhinitis who presents with a suspected fall onset this morning. Patient's wife states that she received a call from the [facility] that the patient was found on the floor. It is suspected that the patient fell. Patient was not initially complaining of pain, but later began complaining of left-sided headache to the wife. When asked, the patient states that he suspected that he fell off of the bed onto the floor, but is unsure of the mechanism of injury. Patient's wife reports patient's hx of urinary incontinence. Also notes that the patient ambulates with a walker PRN. No other concerns were expressed at this time. HPI is limited secondary to the patient's Dementia. PCP: Maryjane Stark MD 
 
Current Outpatient Prescriptions Medication Sig Dispense Refill  rivastigmine (EXELON) 13.3 mg/24 hour patch 1 Patch by TransDERmal route daily.  metoprolol tartrate (LOPRESSOR) 25 mg tablet Take 12.5 mg by mouth daily.  apixaban (ELIQUIS) 5 mg tablet Take 5 mg by mouth two (2) times a day.  atorvastatin (LIPITOR) 40 mg tablet Take  by mouth daily.  fexofenadine (ALLEGRA) 180 mg tablet Take  by mouth daily.  triamcinolone (NASACORT) 55 mcg nasal inhaler 1 Spray as needed.  b complex vitamins tablet Take 1 Tab by mouth daily.  DOCOSAHEXANOIC ACID/EPA (FISH OIL PO) Take  by mouth. Takes 1200mg daily  OTHER Turmeric  1000mg. daily  OTHER L-Serine 500 mg. daily Past History Past Medical History: 
Past Medical History:  
Diagnosis Date  Allergic rhinitis  Dementia  Dyslipidemia   
 HTN (hypertension)  Paroxysmal atrial fibrillation (HCC)  Syncope Past Surgical History: 
History reviewed. No pertinent surgical history. Family History: 
Family History Problem Relation Age of Onset  Parkinson's Disease Mother Social History: 
Social History Substance Use Topics  Smoking status: Former Smoker Quit date: 1/1/1963  Smokeless tobacco: Never Used  Alcohol use Yes Allergies: 
No Known Allergies Review of Systems Review of Systems Constitutional: Negative for chills and fever. HENT: Negative for ear pain and sore throat. Eyes: Negative for pain and visual disturbance. Respiratory: Negative for cough and shortness of breath. Cardiovascular: Negative for chest pain and palpitations. Gastrointestinal: Negative for abdominal pain, diarrhea, nausea and vomiting. Genitourinary: Negative for flank pain. Musculoskeletal: Negative for back pain and neck pain. Neurological: Negative for dizziness, syncope, speech difficulty, light-headedness and headaches. Psychiatric/Behavioral: Negative for agitation. The patient is not nervous/anxious. Physical Exam  
 
Visit Vitals  /67  Pulse 73  Temp 98.1 °F (36.7 °C)  Resp 16  Wt 80.3 kg (177 lb)  SpO2 99%  BMI 25.4 kg/m2 Physical Exam  
Constitutional: He is oriented to person, place, and time. He appears well-developed and well-nourished. No distress. HENT:  
Head: Normocephalic and atraumatic. Mouth/Throat: Oropharynx is clear and moist.  
Eyes: Pupils are equal, round, and reactive to light. No scleral icterus. Neck: Neck supple. No tracheal deviation present. Cardiovascular: Normal rate and regular rhythm. No murmur heard. Pulmonary/Chest: Effort normal and breath sounds normal. No respiratory distress. He exhibits no tenderness and no crepitus. Abdominal: Soft. There is no tenderness. Musculoskeletal: Normal range of motion. He exhibits no edema, tenderness or deformity. Pelvis is stable to compression and rock Neurological: He is alert and oriented to person, place, and time. No cranial nerve deficit. Coordination normal.  
5/5 strength x all extremities (4) Skin: Skin is warm and dry. No rash noted. He is not diaphoretic. Psychiatric: He has a normal mood and affect. Nursing note and vitals reviewed. Diagnostic Study Results Labs - Labs Reviewed URINALYSIS W/ RFLX MICROSCOPIC - Abnormal; Notable for the following:   
    Result Value Protein 30 (*) All other components within normal limits URINE MICROSCOPIC ONLY Radiologic Studies -  
CT HEAD WO CONT Final Result IMPRESSION: 
 
1.  No acute intracranial abnormalities are identified. Medical Decision Making I am the first provider for this patient. I reviewed the vital signs, available nursing notes, past medical history, past surgical history, family history and social history. Vital Signs-Reviewed the patient's vital signs. Pulse Oximetry Analysis -  100% on room air (Interpretation) Records Reviewed: Nursing Notes (Time of Review: 7:02 AM) MDM, Progress Notes, Reevaluation, and Consults: DDX: Contusion, Closed head injury, Concussion, Intracranial abnormality, UTI, Dementia Medical Decision Making (MDM): Patient presents after a suspected fall at home, found on ground next to his bed. Patient has no complaints.  Patient has a history of Dementia. Patient has a non-focal physical exam, with no neurological deficits. Head CT with no acute findings. UA is negative. Wife reports that the patient is at his baseline. ED Course The patient was given: 
Medications - No data to display Patient has no new complaints, changes, or physical findings. Diagnostic studies were reviewed with the patient. Pt and/or family's questions and concerns were addressed. Care plan was outlined, including follow-up with PCP/specialist and return precautions were discussed. Patient is felt to be stable for discharge at this time. Diagnosis Clinical Impression: 1. Fall at home, initial encounter 2. Closed head injury, initial encounter Disposition: Discharged Follow-up Information Follow up With Details Comments Contact Info [facility] EMERGENCY DEPT  If symptoms worsen [address] 
[phone] Zay Chapa MD  As needed [address] [place] [address] Dr [address] [facility] 83 [id number] 
[phone] Patient's Medications Start Taking No medications on file Continue Taking APIXABAN (ELIQUIS) 5 MG TABLET    Take 5 mg by mouth two (2) times a day. ATORVASTATIN (LIPITOR) 40 MG TABLET    Take  by mouth daily. B COMPLEX VITAMINS TABLET    Take 1 Tab by mouth daily. DOCOSAHEXANOIC ACID/EPA (FISH OIL PO)    Take  by mouth. Takes 1200mg daily FEXOFENADINE (ALLEGRA) 180 MG TABLET    Take  by mouth daily. METOPROLOL TARTRATE (LOPRESSOR) 25 MG TABLET    Take 12.5 mg by mouth daily. OTHER    Turmeric  1000mg. daily OTHER    L-Serine 500 mg. daily RIVASTIGMINE (EXELON) 13.3 MG/24 HOUR PATCH    1 Patch by TransDERmal route daily. TRIAMCINOLONE (NASACORT) 55 MCG NASAL INHALER    1 Spray as needed. These Medications have changed No medications on file Stop Taking No medications on file  
 
_______________________________ Scribe Attestation Bandar Jimenez acting as a scribe for and in the presence of Isidra Real DO September 11, 2018 at 7:02 AM 
    
Provider Attestation:     
I personally performed the services described in the documentation, reviewed the documentation, as recorded by the scribe in my presence, and it accurately and completely records my words and actions.  September 11, 2018 at 7:02 AM - Isidra Real DO

## 2018-09-11 NOTE — ED NOTES
Bedside shift change report given to Marlee Hopper RN (oncoming nurse) by DARRYL Taylor (offgoing nurse). Report included the following information ED Summary.

## 2018-09-11 NOTE — DISCHARGE INSTRUCTIONS

## 2018-09-11 NOTE — ED NOTES
Patient cleaned up of incontinent episode, patient was incontinent of urine and it had soaked through his diaper, patient given warm blanket

## 2018-10-07 ENCOUNTER — HOSPITAL ENCOUNTER (EMERGENCY)
Age: 78
Discharge: OTHER INSTITUTION | End: 2018-10-07
Attending: EMERGENCY MEDICINE
Payer: MEDICARE

## 2018-10-07 ENCOUNTER — APPOINTMENT (OUTPATIENT)
Dept: CT IMAGING | Age: 78
End: 2018-10-07
Attending: EMERGENCY MEDICINE
Payer: MEDICARE

## 2018-10-07 VITALS
RESPIRATION RATE: 14 BRPM | DIASTOLIC BLOOD PRESSURE: 87 MMHG | HEART RATE: 88 BPM | TEMPERATURE: 98.1 F | SYSTOLIC BLOOD PRESSURE: 130 MMHG | OXYGEN SATURATION: 100 %

## 2018-10-07 DIAGNOSIS — S09.90XA CLOSED HEAD INJURY, INITIAL ENCOUNTER: Primary | ICD-10-CM

## 2018-10-07 DIAGNOSIS — S51.011A SKIN TEAR OF RIGHT ELBOW WITHOUT COMPLICATION, INITIAL ENCOUNTER: ICD-10-CM

## 2018-10-07 PROCEDURE — 99284 EMERGENCY DEPT VISIT MOD MDM: CPT

## 2018-10-07 PROCEDURE — 70450 CT HEAD/BRAIN W/O DYE: CPT

## 2018-10-07 RX ORDER — KETOTIFEN FUMARATE 0.35 MG/ML
1 SOLUTION/ DROPS OPHTHALMIC 2 TIMES DAILY
Status: ON HOLD | COMMUNITY
End: 2020-01-01 | Stop reason: SDUPTHER

## 2018-10-07 RX ORDER — MELATONIN 5 MG
10 CAPSULE ORAL
Status: ON HOLD | COMMUNITY
End: 2020-01-01 | Stop reason: SDUPTHER

## 2018-10-07 NOTE — ED PROVIDER NOTES
EMERGENCY DEPARTMENT HISTORY AND PHYSICAL EXAM 
 
4:51 AM 
 
 
Date: 10/7/2018 Patient Name: Guille Sosa III History of Presenting Illness Chief Complaint Patient presents with  Fall  Head Injury History Provided By: Patient and EMS Chief Complaint: Jhonny Rojo Duration:  Today PTA Timing:  Acute Location: Head Quality: N/A Severity: Mild Modifying Factors: None Associated Symptoms: Also reports \"a little headache,\" and lumbar back pain, that is resolved. Denies abdominal pain. Additional History (Context): Guille Sosa III is a [de-identified] y.o. male with PMHx of Dementia, HTN, Dyslipidemia, Syncope, Paroxysmal Atrial Fibrillation who presents from [facility] for an unwitnessed fall (per EMS) onset PTA. Patient states he thinks he fell out of bed. Patient reports \"a little headache,\" and notes lumbar back pain that has resolved. Denies abdominal pain. No other concerns were expressed at this time. HPI is limited secondary to patient's Dementia. PCP: Yoli Painter MD 
 
Current Outpatient Prescriptions Medication Sig Dispense Refill  AZO CRANBERRY 390-26-08 mg-mg-million tab Take  by mouth.  apixaban (ELIQUIS) 5 mg tablet Take 5 mg by mouth two (2) times a day.  fish oil-omega-3 fatty acids 340-1,000 mg capsule Take 1 Cap by mouth daily.  ketotifen (ZADITOR) 0.025 % (0.035 %) ophthalmic solution Administer 1 Drop to both eyes two (2) times a day.  melatonin 5 mg cap capsule Take 10 mg by mouth nightly.  rivastigmine (EXELON) 13.3 mg/24 hour patch 1 Patch by TransDERmal route daily.  atorvastatin (LIPITOR) 40 mg tablet Take  by mouth daily.  fexofenadine (ALLEGRA) 180 mg tablet Take  by mouth daily.  b complex vitamins tablet Take 1 Tab by mouth daily.  OTHER Turmeric  1000mg. daily  metoprolol tartrate (LOPRESSOR) 25 mg tablet Take 12.5 mg by mouth daily.  apixaban (ELIQUIS) 5 mg tablet Take 5 mg by mouth two (2) times a day.  triamcinolone (NASACORT) 55 mcg nasal inhaler 1 [place] as needed.  DOCOSAHEXANOIC ACID/EPA (FISH OIL PO) Take  by mouth. Takes 1200mg daily  OTHER L-Serine 500 mg. daily Past History Past Medical History: 
Past Medical History:  
Diagnosis Date  Allergic rhinitis  Dementia  Dyslipidemia   
 HTN (hypertension)  Paroxysmal atrial fibrillation (HCC)  Syncope Past Surgical History: 
History reviewed. No pertinent surgical history. Family History: 
Family History Problem Relation Age of Onset  Parkinson's Disease Mother Social History: 
Social History Substance Use Topics  Smoking status: Former Smoker Quit date: 1/1/1963  Smokeless tobacco: Never Used  Alcohol use Yes Allergies: 
No Known Allergies Review of Systems Review of Systems Unable to perform ROS: Dementia Visit Vitals  /87 (BP 1 Location: Right arm)  Pulse 88  Temp 98.1 °F (36.7 °C)  Resp 14  SpO2 100% Physical Exam / Medical Decision Making I am the first provider for this patient. I reviewed the vital signs, available nursing notes, past medical history, past surgical history, family history and social history. Vital Signs-Reviewed the patient's vital signs. Physical exam: 
General:  Well-developed, well-nourished, no apparent distress. Head:  Normocephalic atraumatic. Eyes:  Pupils midrange extraocular movements intact. No pallor or conjunctival injection. Nose:  No rhinorrhea, inspection grossly normal.   
Ears:  Grossly normal to inspection, no discharge. Mouth:  Mucous membranes moist, no appreciable intraoral lesion. Neck/Back:  Trachea midline, no asymmetry. No pain on palpation down cervical, thoracic, or lumbar spine step-off or deformity. Chest:  Grossly normal inspection, symmetric chest rise. Pulmonary:  Clear to auscultation bilaterally no wheezes rhonchi or rales. Cardiovascular:  S1-S2 no murmurs rubs or gallops. Abdomen: Soft, nontender, nondistended no guarding rebound or peritoneal signs. Extremities:  Grossly normal to inspection, peripheral pulses intact e Skin tear of RIGHT elbow posterior aspect full active range of motion at the shoulder and elbow,  strength is strong bilaterally. Leg lift equal bilaterally Neurologic:  Alert and oriented x 1, no appreciable focal neurologic deficit Skin:  Warm and dry Psychiatric:  Grossly normal mood and affect Nursing note reviewed, vital signs reviewed. ED course: 
Patient with fall out of bed on telemetry oral anticoagulant, he is awake, confuses her location and the date but has a history of dementia. DO NOT RESUSCITATE paperwork in EMR Plan for head CT, monitor here CT head negative Wife at bedside, has a plan to speak with cardiology about the risks and benefits of his anticoagulation Able to ambulate, strict return precautions Patient's presentation, history, physical exam and laboratory evaluations were reviewed. At this time patient was felt to be stable for outpatient management and follow with primary care/specialist.  Patient was instructed to return to the emergency department with any concerns. Disposition: 
 
Discharged home Portions of this chart were created with Dragon medical speech to text program.   Unrecognized errors may be present. Diagnostic Study Results Labs - No results found for this or any previous visit (from the past 12 hour(s)). Radiologic Studies -  
CT HEAD WO CONT    (Results Pending) Diagnosis Clinical Impression: 1. Closed head injury, initial encounter 2. Skin tear of right elbow without complication, initial encounter Follow-up Information Follow up With Details Comments Contact Info Mikki Matta MD Call in 2 days  [address] [place] [address] Dr [address] [facility] [phone] 
[phone] [facility] EMERGENCY DEPT  As needed, If symptoms worsen [address] 
[phone] Discharge Medication List as of 10/7/2018  5:34 AM  
  
CONTINUE these medications which have NOT CHANGED Details [address] [facility] 525-46-67 mg-mg-million tab Take  by mouth., Historical Med  
  
!! apixaban (ELIQUIS) 5 mg tablet Take 5 mg by mouth two (2) times a day., Historical Med  
  
!! fish oil-omega-3 fatty acids 340-1,000 mg capsule Take 1 Cap by mouth daily. , Historical Med  
  
ketotifen (ZADITOR) 0.025 % (0.035 %) ophthalmic solution Administer 1 Drop to both eyes two (2) times a day., Historical Med  
  
melatonin 5 mg cap capsule Take 10 mg by mouth nightly., Historical Med  
  
rivastigmine (EXELON) 13.3 mg/24 hour patch 1 Patch by TransDERmal route daily. , Historical Med  
  
atorvastatin (LIPITOR) 40 mg tablet Take  by mouth daily. , Historical Med  
  
fexofenadine (ALLEGRA) 180 mg tablet Take  by mouth daily. , Historical Med  
  
b complex vitamins tablet Take 1 Tab by mouth daily. , Historical Med  
  
!! OTHER Turmeric  1000mg. daily, Historical Med  
  
metoprolol tartrate (LOPRESSOR) 25 mg tablet Take 12.5 mg by mouth daily. , Historical Med  
  
!! apixaban (ELIQUIS) 5 mg tablet Take 5 mg by mouth two (2) times a day., Historical Med  
  
triamcinolone (NASACORT) 55 mcg nasal inhaler 1 Spray as needed., Historical Med  
  
!! DOCOSAHEXANOIC ACID/EPA (FISH OIL PO) Take  by mouth. Takes 1200mg daily, Historical Med  
  
!! OTHER L-Serine 500 mg. daily, Historical Med  
  
 !! - Potential duplicate medications found. Please discuss with provider.  
  
 
_______________________________ Attestations: 
Scribe Attestation Bandar Jimenez acting as a scribe for and in the presence of Rhys Stokes MD     
October 07, 2018 at 4:51 AM 
    
Provider Attestation: I personally performed the services described in the documentation, reviewed the documentation, as recorded by the scribe in my presence, and it accurately and completely records my words and actions. October 07, 2018 at 4:51 AM - Percy Momin MD   
_______________________________

## 2018-10-07 NOTE — ED NOTES
6:11 AM 
10/07/18 Discharge instructions given to spouse (name) with verbalization of understanding. Patient accompanied by spouse. Patient discharged with the following prescriptions none. Patient discharged to home (destination).   
  
Warren Hull RN

## 2018-10-07 NOTE — ED TRIAGE NOTES
Patient fell out of bed. Raised area to back of head and skin tear to right elbow. Patient states that he did have lower back pain that is resolved now

## 2018-10-07 NOTE — DISCHARGE INSTRUCTIONS
Skin Tears: Care Instructions  Your Care Instructions  As we get older, our skin gets drier and more fragile. Sometimes this can cause the outer layers of skin to split and tear open. Skin tears are treated in different ways. In some cases, doctors use pieces of tape called Steri-Strips to pull the skin together and help it heal. Other times, it's best to leave the tear open and cover it with a special wound-care bandage. Skin tears are usually not serious. They usually heal in a few weeks. But how long you take to heal depends on your body and the type of tear you have. Sometimes the torn piece of skin is used to protect the wound while it heals. But that piece of skin does not heal. It may fall off on its own. Or the doctor may remove it. As your tear heals, it's important to keep it clean to help prevent infection. The doctor has checked you carefully, but problems can develop later. If you notice any problems or new symptoms, get medical treatment right away. Follow-up care is a key part of your treatment and safety. Be sure to make and go to all appointments, and call your doctor if you are having problems. It's also a good idea to know your test results and keep a list of the medicines you take. How can you care for yourself at home? · If you have pain, ask your doctor if you can take an over-the-counter pain medicine, such as acetaminophen (Tylenol), ibuprofen (Advil, Motrin), or naproxen (Aleve). Be safe with medicines. Read and follow all instructions on the label. · If you have a bandage, follow your doctor's instructions for changing it. · If you have Steri-Strips, leave them on until they fall off. · Follow your doctor's instructions about bathing. · Gently wash the skin tear with plain water 2 times a day. Do not rub the area. · Let the area air dry. Or you can pat it carefully with a soft towel. When should you call for help?   Call your doctor now or seek immediate medical care if:    · You have signs of infection, such as:  ¨ Increased pain, swelling, warmth, or redness around the tear. ¨ Red streaks leading from the tear. ¨ Pus draining from the tear. ¨ A fever.     · The tear starts to bleed a lot. Small amounts of blood are normal.    Watch closely for changes in your health, and be sure to contact your doctor if:    · You do not get better as expected. Where can you learn more? Go to http://mehul-donavan.info/. Enter Z943 in the search box to learn more about \"Skin Tears: Care Instructions. \"  Current as of: November 20, 2017  Content Version: 11.8  © 5327-6245 [employer]. Care instructions adapted under license by [employer] (which disclaims liability or warranty for this information). If you have questions about a medical condition or this instruction, always ask your healthcare professional. [place] [address] any warranty or liability for your use of this information. Learning About a Closed Head Injury  What is a closed head injury? A closed head injury happens when your head gets hit hard. The strong force of the blow causes your brain to shake in your skull. This movement can cause the brain to bruise, swell, or tear. Sometimes nerves or blood vessels also get damaged. This can cause bleeding in or around the brain. A concussion is a type of closed head injury. What are the symptoms? If you have a mild concussion, you may have a mild headache or feel \"not quite right. \" These symptoms are common. They usually go away over a few days to 4 weeks. But sometimes after a concussion, you feel like you can't function as well as before the injury. And you have new symptoms. This is called postconcussive syndrome. You may:  · Find it harder to solve problems, think, concentrate, or remember. · Have headaches. · Have changes in your sleep patterns, such as not being able to sleep or sleeping all the time.   · Have changes in your personality. · Not be interested in your usual activities. · Feel angry or anxious without a clear reason. · Lose your sense of taste or smell. · Be dizzy, lightheaded, or unsteady. It may be hard to stand or walk. How is a closed head injury treated? Any person who may have a concussion needs to see a doctor. Some people have to stay in the hospital to be watched. Others can go home safely. If you go home, follow your doctor's instructions. He or she will tell you if you need someone to watch you closely for the next 24 hours or longer. Rest is the best treatment. Get plenty of sleep at night. And try to rest during the day. · Avoid activities that are physically or mentally demanding. These include housework, exercise, and schoolwork. And don't play video games, send text messages, or use the computer. You may need to change your school or work schedule to be able to avoid these activities. · Ask your doctor when it's okay to drive, ride a bike, or operate machinery. · Take an over-the-counter pain medicine, such as acetaminophen (Tylenol), ibuprofen (Advil, Motrin), or naproxen (Aleve). Be safe with medicines. Read and follow all instructions on the label. · Check with your doctor before you use any other medicines for pain. · Do not drink alcohol or use illegal drugs. They can slow recovery. They can also increase your risk of getting a second head injury. Follow-up care is a key part of your treatment and safety. Be sure to make and go to all appointments, and call your doctor if you are having problems. It's also a good idea to know your test results and keep a list of the medicines you take. Where can you learn more? Go to http://mehul-donavan.info/. Enter E235 in the search box to learn more about \"Learning About a Closed Head Injury. \"  Current as of: June 4, 2018  Content Version: 11.8  © 8702-0158 [employer].  Care instructions adapted under license by [address] (which disclaims liability or warranty for this information). If you have questions about a medical condition or this instruction, always ask your healthcare professional. [address] any warranty or liability for your use of this information.

## 2019-01-01 ENCOUNTER — HOME CARE VISIT (OUTPATIENT)
Dept: SCHEDULING | Facility: HOME HEALTH | Age: 79
End: 2019-01-01
Payer: MEDICARE

## 2019-01-01 ENCOUNTER — HOME HEALTH ADMISSION (OUTPATIENT)
Dept: HOME HEALTH SERVICES | Facility: HOME HEALTH | Age: 79
End: 2019-01-01
Payer: MEDICARE

## 2019-01-01 ENCOUNTER — HOME CARE VISIT (OUTPATIENT)
Dept: HOME HEALTH SERVICES | Facility: HOME HEALTH | Age: 79
End: 2019-01-01
Payer: MEDICARE

## 2019-01-01 ENCOUNTER — HOME CARE VISIT (OUTPATIENT)
Dept: HOME HEALTH SERVICES | Facility: HOME HEALTH | Age: 79
End: 2019-01-01

## 2019-01-01 ENCOUNTER — HOSPITAL ENCOUNTER (EMERGENCY)
Age: 79
Discharge: HOME | End: 2019-11-26
Attending: EMERGENCY MEDICINE | Admitting: EMERGENCY MEDICINE
Payer: MEDICARE

## 2019-01-01 ENCOUNTER — HOSPITAL ENCOUNTER (OUTPATIENT)
Dept: LAB | Age: 79
Discharge: HOME | End: 2019-10-15
Payer: MEDICARE

## 2019-01-01 ENCOUNTER — APPOINTMENT (OUTPATIENT)
Dept: CT IMAGING | Age: 79
End: 2019-01-01
Attending: EMERGENCY MEDICINE
Payer: MEDICARE

## 2019-01-01 ENCOUNTER — HOSPITAL ENCOUNTER (EMERGENCY)
Age: 79
Discharge: HOME | End: 2019-11-19
Attending: EMERGENCY MEDICINE
Payer: MEDICARE

## 2019-01-01 VITALS
SYSTOLIC BLOOD PRESSURE: 131 MMHG | TEMPERATURE: 98.7 F | HEART RATE: 68 BPM | DIASTOLIC BLOOD PRESSURE: 64 MMHG | OXYGEN SATURATION: 96 %

## 2019-01-01 VITALS
DIASTOLIC BLOOD PRESSURE: 86 MMHG | TEMPERATURE: 97.8 F | SYSTOLIC BLOOD PRESSURE: 118 MMHG | HEART RATE: 65 BPM | OXYGEN SATURATION: 97 %

## 2019-01-01 VITALS
TEMPERATURE: 97.8 F | SYSTOLIC BLOOD PRESSURE: 129 MMHG | OXYGEN SATURATION: 95 % | HEART RATE: 105 BPM | DIASTOLIC BLOOD PRESSURE: 91 MMHG

## 2019-01-01 VITALS
SYSTOLIC BLOOD PRESSURE: 146 MMHG | HEART RATE: 58 BPM | DIASTOLIC BLOOD PRESSURE: 84 MMHG | OXYGEN SATURATION: 97 % | TEMPERATURE: 97.7 F

## 2019-01-01 VITALS
HEART RATE: 94 BPM | TEMPERATURE: 98.4 F | SYSTOLIC BLOOD PRESSURE: 122 MMHG | OXYGEN SATURATION: 97 % | DIASTOLIC BLOOD PRESSURE: 70 MMHG

## 2019-01-01 VITALS
SYSTOLIC BLOOD PRESSURE: 104 MMHG | OXYGEN SATURATION: 95 % | HEART RATE: 90 BPM | TEMPERATURE: 98.2 F | DIASTOLIC BLOOD PRESSURE: 94 MMHG

## 2019-01-01 VITALS
TEMPERATURE: 97.3 F | HEART RATE: 80 BPM | DIASTOLIC BLOOD PRESSURE: 85 MMHG | SYSTOLIC BLOOD PRESSURE: 157 MMHG | OXYGEN SATURATION: 97 %

## 2019-01-01 VITALS
TEMPERATURE: 97.8 F | SYSTOLIC BLOOD PRESSURE: 122 MMHG | DIASTOLIC BLOOD PRESSURE: 64 MMHG | HEART RATE: 78 BPM | OXYGEN SATURATION: 92 %

## 2019-01-01 VITALS
OXYGEN SATURATION: 95 % | SYSTOLIC BLOOD PRESSURE: 122 MMHG | HEART RATE: 74 BPM | DIASTOLIC BLOOD PRESSURE: 70 MMHG | TEMPERATURE: 98.1 F

## 2019-01-01 VITALS
HEART RATE: 102 BPM | TEMPERATURE: 98.3 F | DIASTOLIC BLOOD PRESSURE: 63 MMHG | OXYGEN SATURATION: 97 % | SYSTOLIC BLOOD PRESSURE: 128 MMHG

## 2019-01-01 VITALS
WEIGHT: 196 LBS | TEMPERATURE: 97.6 F | SYSTOLIC BLOOD PRESSURE: 126 MMHG | OXYGEN SATURATION: 99 % | RESPIRATION RATE: 13 BRPM | HEART RATE: 72 BPM | DIASTOLIC BLOOD PRESSURE: 54 MMHG | BODY MASS INDEX: 28.12 KG/M2

## 2019-01-01 VITALS
SYSTOLIC BLOOD PRESSURE: 114 MMHG | OXYGEN SATURATION: 95 % | DIASTOLIC BLOOD PRESSURE: 82 MMHG | HEART RATE: 101 BPM | TEMPERATURE: 98.4 F

## 2019-01-01 VITALS
HEART RATE: 113 BPM | OXYGEN SATURATION: 89 % | WEIGHT: 185 LBS | TEMPERATURE: 98.3 F | SYSTOLIC BLOOD PRESSURE: 138 MMHG | BODY MASS INDEX: 26.54 KG/M2 | DIASTOLIC BLOOD PRESSURE: 86 MMHG | RESPIRATION RATE: 19 BRPM

## 2019-01-01 VITALS
DIASTOLIC BLOOD PRESSURE: 66 MMHG | HEART RATE: 66 BPM | OXYGEN SATURATION: 93 % | TEMPERATURE: 99.5 F | SYSTOLIC BLOOD PRESSURE: 122 MMHG

## 2019-01-01 VITALS
OXYGEN SATURATION: 93 % | TEMPERATURE: 98.3 F | DIASTOLIC BLOOD PRESSURE: 64 MMHG | HEART RATE: 58 BPM | SYSTOLIC BLOOD PRESSURE: 138 MMHG

## 2019-01-01 VITALS
OXYGEN SATURATION: 95 % | TEMPERATURE: 98.1 F | DIASTOLIC BLOOD PRESSURE: 64 MMHG | HEART RATE: 99 BPM | SYSTOLIC BLOOD PRESSURE: 116 MMHG

## 2019-01-01 VITALS
OXYGEN SATURATION: 94 % | TEMPERATURE: 99.1 F | HEART RATE: 74 BPM | DIASTOLIC BLOOD PRESSURE: 62 MMHG | SYSTOLIC BLOOD PRESSURE: 118 MMHG

## 2019-01-01 VITALS
DIASTOLIC BLOOD PRESSURE: 63 MMHG | HEART RATE: 84 BPM | OXYGEN SATURATION: 96 % | SYSTOLIC BLOOD PRESSURE: 117 MMHG | TEMPERATURE: 97.9 F

## 2019-01-01 VITALS
HEART RATE: 85 BPM | DIASTOLIC BLOOD PRESSURE: 63 MMHG | SYSTOLIC BLOOD PRESSURE: 124 MMHG | OXYGEN SATURATION: 98 % | TEMPERATURE: 98.4 F

## 2019-01-01 VITALS
HEART RATE: 84 BPM | DIASTOLIC BLOOD PRESSURE: 62 MMHG | OXYGEN SATURATION: 96 % | TEMPERATURE: 97.4 F | SYSTOLIC BLOOD PRESSURE: 123 MMHG

## 2019-01-01 VITALS
DIASTOLIC BLOOD PRESSURE: 72 MMHG | TEMPERATURE: 97.5 F | OXYGEN SATURATION: 93 % | SYSTOLIC BLOOD PRESSURE: 120 MMHG | HEART RATE: 83 BPM

## 2019-01-01 VITALS
HEART RATE: 64 BPM | OXYGEN SATURATION: 98 % | DIASTOLIC BLOOD PRESSURE: 65 MMHG | SYSTOLIC BLOOD PRESSURE: 137 MMHG | OXYGEN SATURATION: 96 % | TEMPERATURE: 98.5 F | HEART RATE: 81 BPM | SYSTOLIC BLOOD PRESSURE: 126 MMHG | TEMPERATURE: 98.3 F | DIASTOLIC BLOOD PRESSURE: 101 MMHG

## 2019-01-01 VITALS
DIASTOLIC BLOOD PRESSURE: 75 MMHG | SYSTOLIC BLOOD PRESSURE: 128 MMHG | OXYGEN SATURATION: 98 % | TEMPERATURE: 97.6 F | HEART RATE: 68 BPM

## 2019-01-01 VITALS — SYSTOLIC BLOOD PRESSURE: 124 MMHG | DIASTOLIC BLOOD PRESSURE: 80 MMHG | TEMPERATURE: 96.7 F

## 2019-01-01 VITALS
HEART RATE: 85 BPM | OXYGEN SATURATION: 94 % | DIASTOLIC BLOOD PRESSURE: 64 MMHG | TEMPERATURE: 97.3 F | SYSTOLIC BLOOD PRESSURE: 119 MMHG

## 2019-01-01 VITALS
TEMPERATURE: 98.5 F | SYSTOLIC BLOOD PRESSURE: 138 MMHG | DIASTOLIC BLOOD PRESSURE: 64 MMHG | OXYGEN SATURATION: 94 % | HEART RATE: 81 BPM

## 2019-01-01 VITALS
HEART RATE: 66 BPM | TEMPERATURE: 98.2 F | DIASTOLIC BLOOD PRESSURE: 61 MMHG | OXYGEN SATURATION: 93 % | SYSTOLIC BLOOD PRESSURE: 116 MMHG

## 2019-01-01 VITALS
HEART RATE: 54 BPM | SYSTOLIC BLOOD PRESSURE: 119 MMHG | OXYGEN SATURATION: 98 % | DIASTOLIC BLOOD PRESSURE: 64 MMHG | TEMPERATURE: 98.1 F

## 2019-01-01 VITALS
TEMPERATURE: 98.2 F | HEART RATE: 85 BPM | DIASTOLIC BLOOD PRESSURE: 62 MMHG | OXYGEN SATURATION: 96 % | SYSTOLIC BLOOD PRESSURE: 116 MMHG

## 2019-01-01 VITALS
SYSTOLIC BLOOD PRESSURE: 134 MMHG | HEART RATE: 89 BPM | DIASTOLIC BLOOD PRESSURE: 88 MMHG | OXYGEN SATURATION: 95 % | TEMPERATURE: 98.2 F

## 2019-01-01 DIAGNOSIS — R41.82 ALTERED MENTAL STATUS, UNSPECIFIED ALTERED MENTAL STATUS TYPE: Primary | ICD-10-CM

## 2019-01-01 DIAGNOSIS — G90.9 AUTONOMIC NERVOUS SYSTEM DISORDER DUE TO NEURODEGENERATIVE DISORDER (HCC): Primary | ICD-10-CM

## 2019-01-01 DIAGNOSIS — F03.90 DEMENTIA WITHOUT BEHAVIORAL DISTURBANCE, UNSPECIFIED DEMENTIA TYPE: ICD-10-CM

## 2019-01-01 DIAGNOSIS — G47.10 EXCESSIVE SLEEPINESS: ICD-10-CM

## 2019-01-01 DIAGNOSIS — R55 SYNCOPE AND COLLAPSE: ICD-10-CM

## 2019-01-01 DIAGNOSIS — G31.9 AUTONOMIC NERVOUS SYSTEM DISORDER DUE TO NEURODEGENERATIVE DISORDER (HCC): Primary | ICD-10-CM

## 2019-01-01 LAB
ALBUMIN SERPL-MCNC: 3.8 G/DL (ref 3.4–5)
ALBUMIN SERPL-MCNC: 4 G/DL (ref 3.4–5)
ALBUMIN/GLOB SERPL: 1.1 {RATIO} (ref 0.8–1.7)
ALBUMIN/GLOB SERPL: 1.1 {RATIO} (ref 0.8–1.7)
ALP SERPL-CCNC: 71 U/L (ref 45–117)
ALP SERPL-CCNC: 75 U/L (ref 45–117)
ALT SERPL-CCNC: 15 U/L (ref 16–61)
ALT SERPL-CCNC: 16 U/L (ref 16–61)
ANION GAP SERPL CALC-SCNC: 4 MMOL/L (ref 3–18)
ANION GAP SERPL CALC-SCNC: 5 MMOL/L (ref 3–18)
APPEARANCE UR: CLEAR
AST SERPL-CCNC: 12 U/L (ref 10–38)
AST SERPL-CCNC: 13 U/L (ref 10–38)
ATRIAL RATE: 250 BPM
BACTERIA SPEC CULT: NORMAL
BASOPHILS # BLD: 0 K/UL (ref 0–0.1)
BASOPHILS # BLD: 0 K/UL (ref 0–0.1)
BASOPHILS NFR BLD: 0 % (ref 0–2)
BASOPHILS NFR BLD: 0 % (ref 0–2)
BILIRUB DIRECT SERPL-MCNC: 0.2 MG/DL (ref 0–0.2)
BILIRUB SERPL-MCNC: 0.5 MG/DL (ref 0.2–1)
BILIRUB SERPL-MCNC: 0.6 MG/DL (ref 0.2–1)
BILIRUB UR QL: NEGATIVE
BUN SERPL-MCNC: 22 MG/DL (ref 7–18)
BUN SERPL-MCNC: 27 MG/DL (ref 7–18)
BUN/CREAT SERPL: 21 (ref 12–20)
BUN/CREAT SERPL: 26 (ref 12–20)
CALCIUM SERPL-MCNC: 9.1 MG/DL (ref 8.5–10.1)
CALCIUM SERPL-MCNC: 9.3 MG/DL (ref 8.5–10.1)
CALCULATED R AXIS, ECG10: -40 DEGREES
CALCULATED R AXIS, ECG10: -41 DEGREES
CALCULATED T AXIS, ECG11: -14 DEGREES
CALCULATED T AXIS, ECG11: 46 DEGREES
CHLORIDE SERPL-SCNC: 107 MMOL/L (ref 100–111)
CHLORIDE SERPL-SCNC: 107 MMOL/L (ref 100–111)
CO2 SERPL-SCNC: 27 MMOL/L (ref 21–32)
CO2 SERPL-SCNC: 29 MMOL/L (ref 21–32)
COLOR UR: YELLOW
CREAT SERPL-MCNC: 1.03 MG/DL (ref 0.6–1.3)
CREAT SERPL-MCNC: 1.05 MG/DL (ref 0.6–1.3)
DIAGNOSIS, 93000: NORMAL
DIAGNOSIS, 93000: NORMAL
DIFFERENTIAL METHOD BLD: ABNORMAL
DIFFERENTIAL METHOD BLD: ABNORMAL
EOSINOPHIL # BLD: 0 K/UL (ref 0–0.4)
EOSINOPHIL # BLD: 0.3 K/UL (ref 0–0.4)
EOSINOPHIL NFR BLD: 0 % (ref 0–5)
EOSINOPHIL NFR BLD: 6 % (ref 0–5)
ERYTHROCYTE [DISTWIDTH] IN BLOOD BY AUTOMATED COUNT: 13.3 % (ref 11.6–14.5)
ERYTHROCYTE [DISTWIDTH] IN BLOOD BY AUTOMATED COUNT: 13.4 % (ref 11.6–14.5)
ETHANOL SERPL-MCNC: <3 MG/DL (ref 0–3)
GLOBULIN SER CALC-MCNC: 3.5 G/DL (ref 2–4)
GLOBULIN SER CALC-MCNC: 3.8 G/DL (ref 2–4)
GLUCOSE SERPL-MCNC: 107 MG/DL (ref 74–99)
GLUCOSE SERPL-MCNC: 131 MG/DL (ref 74–99)
GLUCOSE UR STRIP.AUTO-MCNC: NEGATIVE MG/DL
HCT VFR BLD AUTO: 43 % (ref 36–48)
HCT VFR BLD AUTO: 44.1 % (ref 36–48)
HGB BLD-MCNC: 14 G/DL (ref 13–16)
HGB BLD-MCNC: 14 G/DL (ref 13–16)
HGB UR QL STRIP: NEGATIVE
INR PPP: 1 (ref 0.8–1.2)
KETONES UR QL STRIP.AUTO: NEGATIVE MG/DL
LEUKOCYTE ESTERASE UR QL STRIP.AUTO: NEGATIVE
LYMPHOCYTES # BLD: 0.6 K/UL (ref 0.9–3.6)
LYMPHOCYTES # BLD: 1.9 K/UL (ref 0.9–3.6)
LYMPHOCYTES NFR BLD: 33 % (ref 21–52)
LYMPHOCYTES NFR BLD: 7 % (ref 21–52)
MCH RBC QN AUTO: 31.7 PG (ref 24–34)
MCH RBC QN AUTO: 32.3 PG (ref 24–34)
MCHC RBC AUTO-ENTMCNC: 31.7 G/DL (ref 31–37)
MCHC RBC AUTO-ENTMCNC: 32.6 G/DL (ref 31–37)
MCV RBC AUTO: 99.1 FL (ref 74–97)
MCV RBC AUTO: 99.8 FL (ref 74–97)
MONOCYTES # BLD: 0.4 K/UL (ref 0.05–1.2)
MONOCYTES # BLD: 0.5 K/UL (ref 0.05–1.2)
MONOCYTES NFR BLD: 4 % (ref 3–10)
MONOCYTES NFR BLD: 9 % (ref 3–10)
NEUTS SEG # BLD: 2.9 K/UL (ref 1.8–8)
NEUTS SEG # BLD: 7.9 K/UL (ref 1.8–8)
NEUTS SEG NFR BLD: 52 % (ref 40–73)
NEUTS SEG NFR BLD: 89 % (ref 40–73)
NITRITE UR QL STRIP.AUTO: NEGATIVE
PH UR STRIP: 6.5 [PH] (ref 5–8)
PLATELET # BLD AUTO: 259 K/UL (ref 135–420)
PLATELET # BLD AUTO: 273 K/UL (ref 135–420)
PMV BLD AUTO: 10.3 FL (ref 9.2–11.8)
PMV BLD AUTO: 11.3 FL (ref 9.2–11.8)
POTASSIUM SERPL-SCNC: 4.3 MMOL/L (ref 3.5–5.5)
POTASSIUM SERPL-SCNC: 4.6 MMOL/L (ref 3.5–5.5)
PROT SERPL-MCNC: 7.3 G/DL (ref 6.4–8.2)
PROT SERPL-MCNC: 7.8 G/DL (ref 6.4–8.2)
PROT UR STRIP-MCNC: NEGATIVE MG/DL
PROTHROMBIN TIME: 13 SEC (ref 11.5–15.2)
Q-T INTERVAL, ECG07: 386 MS
Q-T INTERVAL, ECG07: 430 MS
QRS DURATION, ECG06: 100 MS
QRS DURATION, ECG06: 108 MS
QTC CALCULATION (BEZET), ECG08: 490 MS
QTC CALCULATION (BEZET), ECG08: 499 MS
RBC # BLD AUTO: 4.34 M/UL (ref 4.7–5.5)
RBC # BLD AUTO: 4.42 M/UL (ref 4.7–5.5)
SERVICE CMNT-IMP: NORMAL
SODIUM SERPL-SCNC: 139 MMOL/L (ref 136–145)
SODIUM SERPL-SCNC: 140 MMOL/L (ref 136–145)
SP GR UR REFRACTOMETRY: 1.02 (ref 1–1.03)
TROPONIN I SERPL-MCNC: <0.02 NG/ML (ref 0–0.04)
TROPONIN I SERPL-MCNC: <0.02 NG/ML (ref 0–0.04)
UROBILINOGEN UR QL STRIP.AUTO: 0.2 EU/DL (ref 0.2–1)
VENTRICULAR RATE, ECG03: 81 BPM
VENTRICULAR RATE, ECG03: 97 BPM
WBC # BLD AUTO: 5.7 K/UL (ref 4.6–13.2)
WBC # BLD AUTO: 8.9 K/UL (ref 4.6–13.2)

## 2019-01-01 PROCEDURE — 74011250636 HC RX REV CODE- 250/636: Performed by: EMERGENCY MEDICINE

## 2019-01-01 PROCEDURE — G0151 HHCP-SERV OF PT,EA 15 MIN: HCPCS

## 2019-01-01 PROCEDURE — 3331090002 HH PPS REVENUE DEBIT

## 2019-01-01 PROCEDURE — 3331090001 HH PPS REVENUE CREDIT

## 2019-01-01 PROCEDURE — G0157 HHC PT ASSISTANT EA 15: HCPCS

## 2019-01-01 PROCEDURE — 85610 PROTHROMBIN TIME: CPT

## 2019-01-01 PROCEDURE — 84484 ASSAY OF TROPONIN QUANT: CPT

## 2019-01-01 PROCEDURE — G0152 HHCP-SERV OF OT,EA 15 MIN: HCPCS

## 2019-01-01 PROCEDURE — 400013 HH SOC

## 2019-01-01 PROCEDURE — 70450 CT HEAD/BRAIN W/O DYE: CPT

## 2019-01-01 PROCEDURE — 96360 HYDRATION IV INFUSION INIT: CPT

## 2019-01-01 PROCEDURE — G0153 HHCP-SVS OF S/L PATH,EA 15MN: HCPCS

## 2019-01-01 PROCEDURE — 80048 BASIC METABOLIC PNL TOTAL CA: CPT

## 2019-01-01 PROCEDURE — 85025 COMPLETE CBC W/AUTO DIFF WBC: CPT

## 2019-01-01 PROCEDURE — 96361 HYDRATE IV INFUSION ADD-ON: CPT

## 2019-01-01 PROCEDURE — 80053 COMPREHEN METABOLIC PANEL: CPT

## 2019-01-01 PROCEDURE — 93005 ELECTROCARDIOGRAM TRACING: CPT

## 2019-01-01 PROCEDURE — 99285 EMERGENCY DEPT VISIT HI MDM: CPT

## 2019-01-01 PROCEDURE — 80076 HEPATIC FUNCTION PANEL: CPT

## 2019-01-01 PROCEDURE — 80307 DRUG TEST PRSMV CHEM ANLYZR: CPT

## 2019-01-01 PROCEDURE — 81003 URINALYSIS AUTO W/O SCOPE: CPT

## 2019-01-01 PROCEDURE — 3331090003 HH PPS REVENUE ADJ

## 2019-01-01 PROCEDURE — 87086 URINE CULTURE/COLONY COUNT: CPT

## 2019-01-01 RX ORDER — MIDODRINE HYDROCHLORIDE 2.5 MG/1
2.5 TABLET ORAL
Qty: 90 TAB | Refills: 3 | Status: SHIPPED | OUTPATIENT
Start: 2019-01-01 | End: 2019-01-01

## 2019-01-01 RX ADMIN — SODIUM CHLORIDE 500 ML: 900 INJECTION, SOLUTION INTRAVENOUS at 02:29

## 2019-01-01 RX ADMIN — SODIUM CHLORIDE, SODIUM LACTATE, POTASSIUM CHLORIDE, AND CALCIUM CHLORIDE 1000 ML: 600; 310; 30; 20 INJECTION, SOLUTION INTRAVENOUS at 18:38

## 2019-11-19 NOTE — ED NOTES
Wife at bedside providing remaining information for pt triage. Patient more alert, speaking with wife. Pt states this is \"about normal for him\"

## 2019-11-19 NOTE — DISCHARGE INSTRUCTIONS
Patient Education     Altered Mental Status: Care Instructions  Your Care Instructions  Altered mental status is a change in how well your brain is working. As a result, you may be confused, be less alert than usual, or act in odd ways. This may include seeing or hearing things that aren't really there (hallucinations). A mental status change has many possible causes. For example, it may be the result of an infection, an imbalance of chemicals in the body, or a chronic disease such as diabetes or COPD. It can also be caused by things such as a head injury, taking certain medicines, or using alcohol or drugs. The doctor may do tests to look for the cause. These tests may include urine tests, blood tests, and imaging tests such as a CT scan. Sometimes a clear cause isn't found. But tests can help the doctor rule out a serious cause of your symptoms. A change in mental status can be scary. But mental status will often return to normal when the cause is treated. So it is important to get any follow-up testing or treatment the doctor has suggested. The doctor has checked you carefully, but problems can develop later. If you notice any problems or new symptoms, get medical treatment right away. Follow-up care is a key part of your treatment and safety. Be sure to make and go to all appointments, and call your doctor if you are having problems. It's also a good idea to know your test results and keep a list of the medicines you take. How can you care for yourself at home? · Be safe with medicines. Take your medicines exactly as prescribed. Call your doctor if you think you are having a problem with your medicine. · Have another adult stay with you until you are better. This can help keep you safe. Ask that person to watch for signs that your mental status is getting worse. When should you call for help? Call 911 anytime you think you may need emergency care.  For example, call if:  · You passed out (lost consciousness). Call your doctor now or seek immediate medical care if:  · Your mental status is getting worse. · You have new symptoms, such as a fever, chills, or shortness of breath. · You do not feel safe. Watch closely for changes in your health, and be sure to contact your doctor if:  · You do not get better as expected. Where can you learn more? Go to [employer]  Enter J452 in the search box to learn more about \"Altered Mental Status: Care Instructions. \"   © 4387-2009 [employer]wise, Incorporated. Care instructions adapted under license by [address] [place] [address] (which disclaims liability or warranty for this information). This care instruction is for use with your licensed healthcare professional. If you have questions about a medical condition or this instruction, always ask your healthcare professional. [place] [address] any warranty or liability for your use of this information.   Content Version: 54.7.971304; Current as of: November 20, 2015

## 2019-11-19 NOTE — ED NOTES
I have reviewed discharge instructions with the spouse. The spouse verbalized understanding. Patient armband given to patient to take home. Patient and spouse were informed of the privacy risks if armband lost or stolen

## 2019-11-19 NOTE — ED PROVIDER NOTES
Drew Chapman III is a 78 y.o. Male history of dementia, hypertension and atrial fibrillation who was given his normal Seroquel and melatonin tonight and his home nurse states she could not wake him up. Patient was responsive slightly to medics but seemed very sleepy. There is no history of trauma. There is no other new medications. Patient is very difficult to arouse here and is unable to get much in the way of history. The history is provided by the EMS personnel and medical records. The history is limited by the condition of the patient. Past Medical History:  
Diagnosis Date  Allergic rhinitis  Dementia ([address]  Dyslipidemia   
 HTN (hypertension)  Paroxysmal atrial fibrillation (HCC)  Syncope History reviewed. No pertinent surgical history. Family History:  
Problem Relation Age of Onset  Parkinson's Disease Mother Social History Socioeconomic History  Marital status:  Spouse name: Not on file  Number of children: Not on file  Years of education: Not on file  Highest education level: Not on file Occupational History  Not on file Social Needs  Financial resource strain: Not on file  Food insecurity:  
  Worry: Not on file Inability: Not on file  Transportation needs:  
  Medical: Not on file Non-medical: Not on file Tobacco Use  Smoking status: Former Smoker Last attempt to quit: 1963 Years since quittin.9  Smokeless tobacco: Never Used Substance and Sexual Activity  Alcohol use: Yes  Drug use: No  
 Sexual activity: Not on file Lifestyle  Physical activity:  
  Days per week: Not on file Minutes per session: Not on file  Stress: Not on file Relationships  Social connections:  
  Talks on phone: Not on file Gets together: Not on file Attends Gnosticist service: Not on file Active member of club or organization: Not on file Attends meetings of clubs or organizations: Not on file Relationship status: Not on file  Intimate partner violence:  
  Fear of current or ex partner: Not on file Emotionally abused: Not on file Physically abused: Not on file Forced sexual activity: Not on file Other Topics Concern  Not on file Social History Narrative  Not on file ALLERGIES: Patient has no known allergies. Review of Systems Unable to perform ROS: Dementia Vitals:  
 11/19/19 0130 11/19/19 0145 11/19/19 0200 11/19/19 0215 BP: (!) 120/91 141/82 110/65 126/54 Pulse: 90 93 (!) 119 72 Resp: 16 15 23 13 Temp:      
SpO2: 97% 98% 94% 99% Weight:      
      
 
Physical Exam  
Constitutional: He appears lethargic. Non-toxic appearance. He does not appear ill. No distress. HENT:  
Head: Normocephalic and atraumatic. Right Ear: External ear normal.  
Left Ear: External ear normal.  
Nose: Nose normal.  
Mouth/Throat: Oropharynx is clear and moist. No oropharyngeal exudate. Eyes: Conjunctivae are normal.  
Neck: Normal range of motion. Cardiovascular: Normal rate, regular rhythm, normal heart sounds and intact distal pulses. Pulmonary/Chest: Effort normal and breath sounds normal. No respiratory distress. Abdominal: Soft. There is no tenderness. Musculoskeletal: Normal range of motion. He exhibits no edema. Neurological: He appears lethargic. He displays tremor. Will respond to painful stimuli and moves all 4 extremities. He does not appear to have a tremor on the right upper extremity. Skin: Skin is warm and dry. He is not diaphoretic. Psychiatric: His behavior is normal.  
Nursing note and vitals reviewed. MDM Procedures Vitals: 
Patient Vitals for the past 12 hrs: 
 Temp Pulse Resp BP SpO2  
11/19/19 0215  72 13 126/54 99 % 11/19/19 0200  (!) 119 23 110/65 94 % 11/19/19 0145  93 15 141/82 98 % 11/19/19 0130  90 16 (!) 120/91 97 % 11/19/19 0115  67 10 112/53 98 % 11/19/19 0111 97.6 °F (36.4 °C)      
11/19/19 0100    130/90   
11/19/19 0039  (!) 102 17  99 % 11/19/19 0034  76 17    
11/19/19 0031  89 21 131/83 97 % Medications ordered:  
Medications  
sodium chloride 0.9 % bolus infusion 500 mL (500 mL IntraVENous New Bag 11/19/19 0229) Lab findings: 
Recent Results (from the past 12 hour(s)) CBC WITH AUTOMATED DIFF Collection Time: 11/19/19 12:53 AM  
Result Value Ref Range WBC 5.7 4.6 - 13.2 K/uL  
 RBC 4.34 (L) 4.70 - 5.50 M/uL  
 HGB 14.0 13.0 - 16.0 g/dL HCT 43.0 36.0 - 48.0 % MCV 99.1 (H) 74.0 - 97.0 FL  
 MCH 32.3 24.0 - 34.0 PG  
 MCHC 32.6 31.0 - 37.0 g/dL  
 RDW 13.3 11.6 - 14.5 % PLATELET 499 073 - 692 K/uL MPV 10.3 9.2 - 11.8 FL  
 NEUTROPHILS 52 40 - 73 % LYMPHOCYTES 33 21 - 52 % MONOCYTES 9 3 - 10 % EOSINOPHILS 6 (H) 0 - 5 % BASOPHILS 0 0 - 2 %  
 ABS. NEUTROPHILS 2.9 1.8 - 8.0 K/UL  
 ABS. LYMPHOCYTES 1.9 0.9 - 3.6 K/UL  
 ABS. MONOCYTES 0.5 0.05 - 1.2 K/UL  
 ABS. EOSINOPHILS 0.3 0.0 - 0.4 K/UL  
 ABS. BASOPHILS 0.0 0.0 - 0.1 K/UL  
 DF AUTOMATED PROTHROMBIN TIME + INR Collection Time: 11/19/19 12:53 AM  
Result Value Ref Range Prothrombin time 13.0 11.5 - 15.2 sec INR 1.0 0.8 - 1.2 METABOLIC PANEL, COMPREHENSIVE Collection Time: 11/19/19 12:53 AM  
Result Value Ref Range Sodium 140 136 - 145 mmol/L Potassium 4.6 3.5 - 5.5 mmol/L Chloride 107 100 - 111 mmol/L  
 CO2 29 21 - 32 mmol/L Anion gap 4 3.0 - 18 mmol/L Glucose 107 (H) 74 - 99 mg/dL BUN 22 (H) 7.0 - 18 MG/DL Creatinine 1.05 0.6 - 1.3 MG/DL  
 BUN/Creatinine ratio 21 (H) 12 - 20 GFR est AA >60 >60 ml/min/1.73m2 GFR est non-AA >60 >60 ml/min/1.73m2 Calcium 9.1 8.5 - 10.1 MG/DL Bilirubin, total 0.5 0.2 - 1.0 MG/DL  
 ALT (SGPT) 16 16 - 61 U/L  
 AST (SGOT) 12 10 - 38 U/L Alk. phosphatase 75 45 - 117 U/L Protein, total 7.3 6.4 - 8.2 g/dL Albumin 3.8 3.4 - 5.0 g/dL Globulin 3.5 2.0 - 4.0 g/dL A-G Ratio 1.1 0.8 - 1.7    
TROPONIN I Collection Time: 11/19/19 12:53 AM  
Result Value Ref Range Troponin-I, QT <0.02 0.0 - 0.045 NG/ML  
ETHYL ALCOHOL Collection Time: 11/19/19 12:53 AM  
Result Value Ref Range ALCOHOL(ETHYL),SERUM <3 0 - 3 MG/DL  
 
 
EKG interpretation by ED Physician: 
Atrial fibrillation with significant baseline artifact and nonspecific ST changes. Rate 81   History of atrial fibrillation in the past. 
 
Cardiac monitor: Normal rate with irregular rhythm and no ectopy Pulse ox: 98% room air X-Ray, CT or other radiology findings or impressions: 
CT HEAD WO CONT    (Results Pending) No acute process per preliminary Progress notes, Consult notes or additional Procedure notes:  
Patient slightly more alert at this time. He did respond to voice to his wife. Suspect this more medication related issue. Do not feel he requires admission to the hospital or other work-up at this time I have discussed with patient and/or family/sig other the results, interpretation of any imaging if performed, suspected diagnosis and treatment plan to include instructions regarding the diagnoses listed to which understanding was expressed with all questions answered Reevaluation of patient:  
stable Disposition: 
Diagnosis: 1. Altered mental status, unspecified altered mental status type 2. Excessive sleepiness Disposition: home Follow-up Information Follow up With Specialties Details Why Contact Info Maeve Malave MD Family Practice Schedule an appointment as soon as possible for a visit  [address] [place] [address] [address] [facility] [address] [phone] 
[phone] [facility] EMERGENCY DEPT Emergency Medicine  If symptoms worsen [address] 
[phone] Patient's Medications Start Taking No medications on file Continue Taking APIXABAN (ELIQUIS) 5 MG TABLET    Take 5 mg by mouth two (2) times a day. APIXABAN (ELIQUIS) 5 MG TABLET    Take 5 mg by mouth two (2) times a day. ARTIFICIAL TEARS,HYPROMELLOSE, (SYSTANE GEL) 0.3 % GEL OPHTHALMIC OINTMENT    Administer 1 Strip to both eyes four (4) times daily. 1 strip ATORVASTATIN (LIPITOR) 40 MG TABLET    Take 40 mg by mouth daily. AZO CRANBERRY 188-22-85 MG-MG-MILLION TAB    Take 1 Tab by mouth daily. B COMPLEX VITAMINS TABLET    Take 1 Tab by mouth daily. DOCOSAHEXANOIC ACID/EPA (FISH OIL PO)    Take 1,200 mg by mouth. Takes 1200mg daily FEXOFENADINE (ALLEGRA) 180 MG TABLET    Take 180 mg by mouth daily as needed for Allergies, Itching or Rhinitis. FISH OIL-OMEGA-3 FATTY ACIDS 340-1,000 MG CAPSULE    Take 1 Cap by mouth daily. KETOTIFEN (ZADITOR) 0.025 % (0.035 %) OPHTHALMIC SOLUTION    Administer 1 Drop to both eyes two (2) times a day. MELATONIN 5 MG CAP CAPSULE    Take 10 mg by mouth nightly. dissolvable tabs METOPROLOL TARTRATE (LOPRESSOR) 25 MG TABLET    Take 12.5 mg by mouth daily. OTHER    Turmeric  1000mg. daily OTHER    L-Serine 500 mg. daily QUETIAPINE (SEROQUEL) 25 MG TABLET    Take 25 mg by mouth daily. RIVASTIGMINE (EXELON) 13.3 MG/24 HOUR PATCH    1 Patch by TransDERmal route daily. TRIAMCINOLONE (NASACORT) 55 MCG NASAL INHALER    1 [place] by Both Nostrils route as needed for Cough, Diarrhea, Fever, Nausea or Pain. These Medications have changed No medications on file Stop Taking No medications on file

## 2019-11-19 NOTE — ED TRIAGE NOTES
Pt brought to ED [facility] c/o 'lethargy'. Pt was given melatonin and seroquel qhs. Nurse went to see if pt needed diaper changed, unable to rouse pt and called 911.  Pt responds to pain only on presentation to ED

## 2019-11-26 NOTE — ED NOTES
Patient brought to the ED via EMS from [facility], states he had a syncopal episode, states they saw a run of v tach but were not sure if it was the movement of the stretcher. Patient vomited on the floor on arrival. 
 
Patient cleaned and placed in a bed, gown in place.

## 2019-11-26 NOTE — ED NOTES
Family at the bedside, states he vomited today as well, states he was holding his stomach as though he was in pain

## 2019-11-27 NOTE — ED PROVIDER NOTES
EMERGENCY DEPARTMENT HISTORY AND PHYSICAL EXAM 
 
 
Date: 11/26/2019 Patient Name: Addi Pratt III History of Presenting Illness Chief Complaint Patient presents with  Nausea  Syncope History (Context): Addi Pratt III is a 78 y.o. gentleman with dementia thought to be Lewy body dementia, paroxysmal A. fib, not anticoagulated, and frequent syncope and nausea and vomiting who presents with syncope and nausea and vomiting with highly labile blood pressures at his facility. This is been worse today. The patient has been seen for similar syndrome about 1 week ago. There are no exacerbating/rhythm features or other associated symptoms. The patient does not have an electronic heart rate monitoring device. On review of systems, the patient denies fever, chills, recent trauma trauma, chest pain, back pain, abdominal pain, nausea, vomiting, diaphoresis, seizure-like activity, numbness, weakness, tingling. PCP: John Ellis MD 
 
Current Outpatient Medications Medication Sig Dispense Refill  Comp. Stocking,Thigh,Long,Large misc 1 Package by Does Not Apply route daily. 1 Package 6  
 midodrine (PROAMITINE) 2.5 mg tablet Take 1 Tab by mouth three (3) times daily (with meals) for 30 days. 90 Tab 3  
 artificial tears,hypromellose, (SYSTANE GEL) 0.3 % gel ophthalmic ointment Administer 1 Strip to both eyes four (4) times daily. 1 strip  QUEtiapine (SEROQUEL) 25 mg tablet Take 25 mg by mouth daily.  ketotifen (ZADITOR) 0.025 % (0.035 %) ophthalmic solution Administer 1 Drop to both eyes two (2) times a day.  melatonin 5 mg cap capsule Take 10 mg by mouth nightly. dissolvable tabs  rivastigmine (EXELON) 13.3 mg/24 hour patch 1 Patch by TransDERmal route daily.  triamcinolone (NASACORT) 55 mcg nasal inhaler 1 [place] by Both Nostrils route as needed for Cough, Diarrhea, Fever, Nausea or Pain. Past History Past Medical History: 
Past Medical History: Diagnosis Date  Allergic rhinitis  Dementia [address] [address]  Dyslipidemia   
 HTN (hypertension)  Ill-defined condition   
 hyperlipidemia  Paroxysmal atrial fibrillation (HCC)  Syncope Past Surgical History: 
History reviewed. No pertinent surgical history. Family History: 
Family History Problem Relation Age of Onset  Parkinson's Disease Mother Social History: 
Social History Tobacco Use  Smoking status: Former Smoker Last attempt to quit: 1963 Years since quittin.9  Smokeless tobacco: Never Used Substance Use Topics  Alcohol use: Yes  Drug use: No  
 
 
Allergies: 
No Known Allergies PMH, PSH, family history, social history, allergies reviewed with the patient with significant items noted above. Review of Systems As per HPI, otherwise reviewed and negative. Physical Exam  
 
Vitals:  
 19 1900 19 1915 19 1929 19 1930 BP: (!) 189/127 (!) 256/201 (!) 163/91 (!) 143/91 Pulse: (!) 111 93  (!) 106 Resp: 17 13  15 Temp:      
SpO2: (!) 89% Weight:      
 
Of note: 1 blood pressure observed was map of 64 Blood pressure extraordinarily labile without treatment. The same goes for tachycardia. Heart rate ranging from 70s to 140s Gen: Well-appearing, in no acute distress HEENT: Normocephalic, sclera anicteric Cardiovascular: Tachycardic, irregularly irregular rhythm, regular rhythm, no murmurs, rubs, gallops. Pulses intact and equal distally. Pulmonary: No respiratory distress. No stridor. Clear lungs. ABD: Soft, nontender, nondistended. Neuro: Alert. Normal speech. Confused mentation Psych: Confused, but baseline mental status per family : No CVA tenderness EXT: No swelling. Moves all extremities well. No cyanosis or clubbing. Skin: Warm and well-perfused. Diagnostic Study Results Labs - Recent Results (from the past 12 hour(s)) CBC WITH AUTOMATED DIFF  
 Collection Time: 11/26/19  5:41 PM  
Result Value Ref Range WBC 8.9 4.6 - 13.2 K/uL  
 RBC 4.42 (L) 4.70 - 5.50 M/uL  
 HGB 14.0 13.0 - 16.0 g/dL HCT 44.1 36.0 - 48.0 % MCV 99.8 (H) 74.0 - 97.0 FL  
 MCH 31.7 24.0 - 34.0 PG  
 MCHC 31.7 31.0 - 37.0 g/dL  
 RDW 13.4 11.6 - 14.5 % PLATELET 374 287 - 679 K/uL MPV 11.3 9.2 - 11.8 FL  
 NEUTROPHILS 89 (H) 40 - 73 % LYMPHOCYTES 7 (L) 21 - 52 % MONOCYTES 4 3 - 10 % EOSINOPHILS 0 0 - 5 % BASOPHILS 0 0 - 2 %  
 ABS. NEUTROPHILS 7.9 1.8 - 8.0 K/UL  
 ABS. LYMPHOCYTES 0.6 (L) 0.9 - 3.6 K/UL  
 ABS. MONOCYTES 0.4 0.05 - 1.2 K/UL  
 ABS. EOSINOPHILS 0.0 0.0 - 0.4 K/UL  
 ABS. BASOPHILS 0.0 0.0 - 0.1 K/UL  
 DF AUTOMATED METABOLIC PANEL, BASIC Collection Time: 11/26/19  5:41 PM  
Result Value Ref Range Sodium 139 136 - 145 mmol/L Potassium 4.3 3.5 - 5.5 mmol/L Chloride 107 100 - 111 mmol/L  
 CO2 27 21 - 32 mmol/L Anion gap 5 3.0 - 18 mmol/L Glucose 131 (H) 74 - 99 mg/dL BUN 27 (H) 7.0 - 18 MG/DL Creatinine 1.03 0.6 - 1.3 MG/DL  
 BUN/Creatinine ratio 26 (H) 12 - 20 GFR est AA >60 >60 ml/min/1.73m2 GFR est non-AA >60 >60 ml/min/1.73m2 Calcium 9.3 8.5 - 10.1 MG/DL  
TROPONIN I Collection Time: 11/26/19  5:41 PM  
Result Value Ref Range Troponin-I, QT <0.02 0.0 - 0.045 NG/ML  
HEPATIC FUNCTION PANEL Collection Time: 11/26/19  5:41 PM  
Result Value Ref Range Protein, total 7.8 6.4 - 8.2 g/dL Albumin 4.0 3.4 - 5.0 g/dL Globulin 3.8 2.0 - 4.0 g/dL A-G Ratio 1.1 0.8 - 1.7 Bilirubin, total 0.6 0.2 - 1.0 MG/DL Bilirubin, direct 0.2 0.0 - 0.2 MG/DL Alk. phosphatase 71 45 - 117 U/L  
 AST (SGOT) 13 10 - 38 U/L  
 ALT (SGPT) 15 (L) 16 - 61 U/L Radiologic Studies - No orders to display CT Results  (Last 48 hours) None CXR Results  (Last 48 hours) None Medical Decision Making I am the first provider for this patient. I reviewed the vital signs, available nursing notes, past medical history, past surgical history, family history and social history. Vital Signs-Reviewed the patient's vital signs. EKG: Interpreted by myself, as noted below in ED course. No evidence of long/short QT, short UT interval, WPW, high degree heart block, ARVD, HOCM, Brugada syndrome, frequent PVCs. Records Reviewed: Personally, on initial evaluation MDM:  
Patient presents with loss of consciousness. Exam significant for highly labile blood pressure, even as I was in the room. Highly labile heart rate as well. DDX considered: Autonomic dysfunction related to Lewy body dementia, arrhythmia, vasovagal, orthostatic hypotension, other cause of syncope (electrolyte abnormality, anemia), anxiety DDX thought to be less likely but also considered due to high risk condition: ACS, PE, seizure Patient condition on initial evaluation: Stable, but sick Plan:  
Close Observation Cardiac monitoring As per orders noted below: 
Orders Placed This Encounter  CBC WITH AUTOMATED DIFF  
 BASIC METABOLIC PANEL  
 TROPONIN I  
 HEPATIC FUNCTION PANEL  
 CARDIAC MONITORING  
 EKG, 12 LEAD, INITIAL  lactated ringers bolus infusion 1,000 mL  Comp. Bernardino Vanegas  midodrine (PROAMITINE) 2.5 mg tablet ED Course:  
ED Course as of Nov 26 2047 Tue Nov 26, 2019 2020 Within normal limits HEPATIC FUNCTION PANEL(!):   
Protein, total 7.8 Albumin 4.0 Globulin 3.8 A-G Ratio 1.1 Bilirubin, total 0.6 Bilirubin, direct 0.2 Alk. phosphatase 71 AST 13  
ALT (SGPT) 15(!) [DT] 2021 Within normal limits BASIC METABOLIC PANEL(!):   
Sodium 139 Potassium 4.3 Chloride 107 CO2 27 Anion gap 5 Glucose 131(!) BUN 27(!) Creatinine 1.03  
BUN/Creatinine ratio 26(!)  
GFR est AA >60  
GFR est non-AA >60 Calcium 9.3 [DT] 2021 Undetectable TROPONIN I:   
Troponin-I, Qt. <0.02 [DT] 2021 Within normal limits CBC WITH AUTOMATED DIFF(!):   
WBC 8.9  
RBC 4.42(!) HGB 14.0  
HCT 44.1 MCV 99.8(!) MCH 31.7 MCHC 31.7 RDW 13.4 PLATELET 988 MPV 11.3 NEUTROPHILS 89(!) LYMPHOCYTES 7(!) MONOCYTES 4  
EOSINOPHILS 0  
BASOPHILS 0  
ABS. NEUTROPHILS 7.9 ABS. LYMPHOCYTES 0.6(!)  
ABS. MONOCYTES 0.4  
ABS. EOSINOPHILS 0.0  
ABS. BASOPHILS 0.0  
DF AUTOMATED [DT] 2036 Continuous cardiac monitoring demonstrated highly labile blood pressures. The patient is likely having autonomic disturbance related to his Lewy body dementia. The patient will be discharged home with no close neurology follow-up. [DT] ED Course User Index 
[DT] Deric Garcia MD  
 
 
Patient condition at time of disposition: Stable DISCHARGE NOTE:  
Pt has been reexamined. Patient has no new complaints, changes, or physical findings. Care plan outlined and precautions discussed. Results were reviewed. All medications were reviewed with the patient's wife; will d/c home with Midodrine and compression stockings. All of pt's wife's questions and concerns were addressed. Alarm symptoms and return precautions associated with chief complaint and evaluation were reviewed with the patient in detail. The patient's wife demonstrated adequate understanding. Patient's wife was instructed and agrees to follow up with neurology and PCP, as well as to return to the ED upon further deterioration. Patient is ready to go home. The patient and wife are happy with this plan Follow-up Information Follow up With Specialties Details Why Contact Info Phma Malave, [address]  [address] [facility] [address] [phone] Heraclio Tristan MD Neurology Schedule an appointment as soon as possible for a visit  [address] [facility] [address] [facility] [address] [phone] Current Discharge Medication List  
  
START taking these medications Details Comp.Stocking,Thigh,Long,Large misc 1 Package by Does Not Apply route daily. Qty: 1 Package, Refills: 6  
  
midodrine (PROAMITINE) 2.5 mg tablet Take 1 Tab by mouth three (3) times daily (with meals) for 30 days. Qty: 90 Tab, Refills: 3 Diagnosis Clinical Impression: 1. 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 Dementia without behavioral disturbance, unspecified dementia type ([facility] [address] 3. Syncope and collapse Signed, Latonia Todd MD 
Emergency Physician 
[facility] As a voice dictation software was utilized to dictate this note, minor word transpositions can occur. I apologize for confusing wording and typographic errors. Please feel free to contact me for clarification.

## 2019-11-27 NOTE — ED NOTES
Assisted the wife in cleaning the patient, wife had depends with her, patient does have some raw skin around his rectum

## 2019-11-27 NOTE — ED NOTES
Wife states the patient's BP was elevated earlier today, states he is usually in the 120's over 80's

## 2019-11-27 NOTE — DISCHARGE INSTRUCTIONS
Patient Education        Dementia: Care Instructions  Your Care Instructions    Dementia is a loss of mental skills that affects your daily life. It is different than the occasional trouble with memory that is part of aging. You may find it hard to remember things that you feel you should be able to remember. Or you may feel that your mind is just not working as well as usual.  Finding out that you have dementia is a shock. You may be afraid and worried about how the condition will change your life. Although there is no cure at this time, medicine may slow memory loss and improve thinking for a while. Other medicines may be able to help you sleep or cope with depression and behavior changes. Dementia often gets worse slowly. But it can get worse quickly. As dementia gets worse, it may become harder to do common things that take planning, like making a list and going shopping. Over time, the disease may make it hard for you to take care of yourself. Some people with dementia need others to help care for them. Dementia is different for everyone. You may be able to function well for a long time. In the early stage of the condition, you can do things at home to make life easier and safer. You also can keep doing your hobbies and other activities. Many people find comfort in planning now for their future needs. Follow-up care is a key part of your treatment and safety. Be sure to make and go to all appointments, and call your doctor if you are having problems. It's also a good idea to know your test results and keep a list of the medicines you take. How can you care for yourself at home? · Take your medicines exactly as prescribed. Call your doctor if you think you are having a problem with your medicine. · Eat healthy foods. Eat lots of whole grains, fruits, and vegetables every day.  If you are not hungry, try snacks or nutritional drinks such as Boost, Ensure, or Sustacal.  · If you have problems sleeping:  ? Try not to nap too close to your bedtime. ? Exercise regularly. Walking is a good choice. ? Try a glass of warm milk or caffeine-free herbal tea before bed. · Do tasks and activities during the time of day when you feel your best. It may help to develop a daily routine. · Post labels, lists, and sticky notes to help you remember things. Write your activities on a calendar you can easily find. Put your clock where you can easily see it. · Stay active. Take walks in familiar places, or with friends or loved ones. Try to stay active mentally too. Read and work crossword puzzles if you enjoy these activities. · Do not drive unless you can pass an on-road driving test. If you are not sure if you are safe to drive, your state 's license bureau can test you. · Keep a cordless phone and a flashlight with new batteries by your bed. If possible, put a phone in each of the main rooms of your house, or carry a cell phone in case you fall and cannot reach a phone. Or, you can wear a device around your neck or wrist. You push a button that sends a signal for help. Acknowledge your emotions and plan for the future  · Talk openly and honestly with your doctor. · Let yourself grieve. It is common to feel angry, scared, frustrated, anxious, or depressed. · Get emotional support from family, friends, a support group, or a counselor experienced in working with people who have dementia. · Ask for help if you need it. · Tell your doctor how you feel. You may feel upset, angry, or worried at times. Many things can cause this, including poor sleep, medicine side effects, confusion, and pain. Your doctor may be able to help you. · Plan for the future. ? Talk to your family and doctor about preparing a living will and other important papers while you can make decisions. These papers tell your doctors how to care for you at the end of your life.   ? Consider naming a person to make decisions about your care if you are not able to.  When should you call for help? Call 911 anytime you think you may need emergency care. For example, call if:    · You are lost and do not know whom to call.     · You are injured and do not know whom to call.    Call your doctor now or seek immediate medical care if:    · You are more confused or upset than usual.     · You feel like you could hurt yourself because your mind is not working well.   Kevin Snell closely for changes in your health, and be sure to contact your doctor if you have any problems. Where can you learn more? Go to http://mehul-donavan.info/. Enter F379 in the search box to learn more about \"Dementia: Care Instructions. \"  Current as of: May 28, 2019  Content Version: 12.2  © 1697-9169 [employer]. Care instructions adapted under license by [employer] (which disclaims liability or warranty for this information). If you have questions about a medical condition or this instruction, always ask your healthcare professional. [address] any warranty or liability for your use of this information.

## 2019-11-27 NOTE — ED NOTES
I have reviewed discharge instructions with the patients family  The patients family verbalized understanding. Patient armband removed and shredded. Pt d/cd to [facility] awake, alert and in NAD.  VSS. All questions answered.

## 2020-01-01 ENCOUNTER — HOME CARE VISIT (OUTPATIENT)
Dept: SCHEDULING | Facility: HOME HEALTH | Age: 80
End: 2020-01-01
Payer: MEDICARE

## 2020-01-01 ENCOUNTER — HOME CARE VISIT (OUTPATIENT)
Dept: HOSPICE | Facility: HOSPICE | Age: 80
End: 2020-01-01
Payer: MEDICARE

## 2020-01-01 ENCOUNTER — HOME CARE VISIT (OUTPATIENT)
Dept: HOME HEALTH SERVICES | Facility: HOME HEALTH | Age: 80
End: 2020-01-01
Payer: MEDICARE

## 2020-01-01 ENCOUNTER — APPOINTMENT (OUTPATIENT)
Dept: GENERAL RADIOLOGY | Age: 80
DRG: 871 | End: 2020-01-01
Attending: HOSPITALIST
Payer: MEDICARE

## 2020-01-01 ENCOUNTER — APPOINTMENT (OUTPATIENT)
Dept: CT IMAGING | Age: 80
DRG: 871 | End: 2020-01-01
Attending: EMERGENCY MEDICINE
Payer: MEDICARE

## 2020-01-01 ENCOUNTER — HOSPICE ADMISSION (OUTPATIENT)
Dept: HOSPICE | Facility: HOSPICE | Age: 80
End: 2020-01-01
Payer: MEDICARE

## 2020-01-01 ENCOUNTER — APPOINTMENT (OUTPATIENT)
Dept: NON INVASIVE DIAGNOSTICS | Age: 80
DRG: 871 | End: 2020-01-01
Attending: PHYSICIAN ASSISTANT
Payer: MEDICARE

## 2020-01-01 ENCOUNTER — HOME HEALTH ADMISSION (OUTPATIENT)
Dept: HOME HEALTH SERVICES | Facility: HOME HEALTH | Age: 80
End: 2020-01-01
Payer: MEDICARE

## 2020-01-01 ENCOUNTER — APPOINTMENT (OUTPATIENT)
Dept: GENERAL RADIOLOGY | Age: 80
DRG: 871 | End: 2020-01-01
Attending: EMERGENCY MEDICINE
Payer: MEDICARE

## 2020-01-01 ENCOUNTER — HOME CARE VISIT (OUTPATIENT)
Dept: HOME HEALTH SERVICES | Facility: HOME HEALTH | Age: 80
End: 2020-01-01

## 2020-01-01 ENCOUNTER — HOSPITAL ENCOUNTER (INPATIENT)
Age: 80
LOS: 4 days | Discharge: HOSPICE | DRG: 871 | End: 2020-01-31
Attending: EMERGENCY MEDICINE | Admitting: HOSPITALIST
Payer: MEDICARE

## 2020-01-01 VITALS
DIASTOLIC BLOOD PRESSURE: 54 MMHG | RESPIRATION RATE: 16 BRPM | SYSTOLIC BLOOD PRESSURE: 90 MMHG | OXYGEN SATURATION: 97 % | HEART RATE: 77 BPM

## 2020-01-01 VITALS
HEART RATE: 59 BPM | RESPIRATION RATE: 15 BRPM | DIASTOLIC BLOOD PRESSURE: 77 MMHG | SYSTOLIC BLOOD PRESSURE: 100 MMHG | OXYGEN SATURATION: 97 %

## 2020-01-01 VITALS
OXYGEN SATURATION: 94 % | SYSTOLIC BLOOD PRESSURE: 160 MMHG | RESPIRATION RATE: 22 BRPM | DIASTOLIC BLOOD PRESSURE: 76 MMHG | HEART RATE: 125 BPM | TEMPERATURE: 100.4 F

## 2020-01-01 VITALS
TEMPERATURE: 100.8 F | RESPIRATION RATE: 22 BRPM | HEART RATE: 70 BPM | SYSTOLIC BLOOD PRESSURE: 135 MMHG | OXYGEN SATURATION: 90 % | DIASTOLIC BLOOD PRESSURE: 80 MMHG

## 2020-01-01 VITALS
HEART RATE: 84 BPM | OXYGEN SATURATION: 90 % | SYSTOLIC BLOOD PRESSURE: 120 MMHG | TEMPERATURE: 97.4 F | DIASTOLIC BLOOD PRESSURE: 64 MMHG

## 2020-01-01 VITALS
DIASTOLIC BLOOD PRESSURE: 64 MMHG | TEMPERATURE: 97.4 F | SYSTOLIC BLOOD PRESSURE: 120 MMHG | HEART RATE: 80 BPM | OXYGEN SATURATION: 90 %

## 2020-01-01 VITALS
OXYGEN SATURATION: 100 % | WEIGHT: 177.3 LBS | HEIGHT: 72 IN | RESPIRATION RATE: 23 BRPM | BODY MASS INDEX: 24.01 KG/M2 | TEMPERATURE: 99.7 F | DIASTOLIC BLOOD PRESSURE: 69 MMHG | SYSTOLIC BLOOD PRESSURE: 117 MMHG | HEART RATE: 96 BPM

## 2020-01-01 DIAGNOSIS — I48.91 ATRIAL FIBRILLATION WITH RAPID VENTRICULAR RESPONSE (HCC): ICD-10-CM

## 2020-01-01 DIAGNOSIS — N39.0 URINARY TRACT INFECTION WITHOUT HEMATURIA, SITE UNSPECIFIED: ICD-10-CM

## 2020-01-01 DIAGNOSIS — E87.0 HYPERNATREMIA: Primary | ICD-10-CM

## 2020-01-01 DIAGNOSIS — M15.9 PRIMARY OSTEOARTHRITIS INVOLVING MULTIPLE JOINTS: ICD-10-CM

## 2020-01-01 DIAGNOSIS — F41.9 ANXIETY: ICD-10-CM

## 2020-01-01 DIAGNOSIS — R41.0 CONFUSION: ICD-10-CM

## 2020-01-01 DIAGNOSIS — E86.0 DEHYDRATION: ICD-10-CM

## 2020-01-01 DIAGNOSIS — R53.1 GENERALIZED WEAKNESS: ICD-10-CM

## 2020-01-01 DIAGNOSIS — N17.9 AKI (ACUTE KIDNEY INJURY) (HCC): ICD-10-CM

## 2020-01-01 LAB
ALBUMIN SERPL-MCNC: 2.7 G/DL (ref 3.4–5)
ALBUMIN/GLOB SERPL: 0.6 {RATIO} (ref 0.8–1.7)
ALP SERPL-CCNC: 75 U/L (ref 45–117)
ALT SERPL-CCNC: 16 U/L (ref 16–61)
AMMONIA PLAS-SCNC: <10 UMOL/L (ref 11–32)
ANION GAP SERPL CALC-SCNC: 5 MMOL/L (ref 3–18)
ANION GAP SERPL CALC-SCNC: 6 MMOL/L (ref 3–18)
ANION GAP SERPL CALC-SCNC: 7 MMOL/L (ref 3–18)
ANION GAP SERPL CALC-SCNC: 8 MMOL/L (ref 3–18)
ANION GAP SERPL CALC-SCNC: 9 MMOL/L (ref 3–18)
ANION GAP SERPL CALC-SCNC: 9 MMOL/L (ref 3–18)
APPEARANCE UR: ABNORMAL
AST SERPL-CCNC: 21 U/L (ref 10–38)
BACTERIA SPEC CULT: ABNORMAL
BACTERIA SPEC CULT: NORMAL
BACTERIA URNS QL MICRO: ABNORMAL /HPF
BASOPHILS # BLD: 0 K/UL (ref 0–0.1)
BASOPHILS NFR BLD: 0 % (ref 0–2)
BILIRUB SERPL-MCNC: 0.6 MG/DL (ref 0.2–1)
BILIRUB UR QL: NEGATIVE
BNP SERPL-MCNC: 3615 PG/ML (ref 0–1800)
BUN SERPL-MCNC: 20 MG/DL (ref 7–18)
BUN SERPL-MCNC: 22 MG/DL (ref 7–18)
BUN SERPL-MCNC: 23 MG/DL (ref 7–18)
BUN SERPL-MCNC: 25 MG/DL (ref 7–18)
BUN SERPL-MCNC: 27 MG/DL (ref 7–18)
BUN SERPL-MCNC: 27 MG/DL (ref 7–18)
BUN SERPL-MCNC: 28 MG/DL (ref 7–18)
BUN SERPL-MCNC: 30 MG/DL (ref 7–18)
BUN SERPL-MCNC: 35 MG/DL (ref 7–18)
BUN SERPL-MCNC: 36 MG/DL (ref 7–18)
BUN SERPL-MCNC: 36 MG/DL (ref 7–18)
BUN SERPL-MCNC: 37 MG/DL (ref 7–18)
BUN SERPL-MCNC: 43 MG/DL (ref 7–18)
BUN SERPL-MCNC: 45 MG/DL (ref 7–18)
BUN SERPL-MCNC: 58 MG/DL (ref 7–18)
BUN/CREAT SERPL: 22 (ref 12–20)
BUN/CREAT SERPL: 23 (ref 12–20)
BUN/CREAT SERPL: 26 (ref 12–20)
BUN/CREAT SERPL: 27 (ref 12–20)
BUN/CREAT SERPL: 27 (ref 12–20)
BUN/CREAT SERPL: 29 (ref 12–20)
BUN/CREAT SERPL: 29 (ref 12–20)
BUN/CREAT SERPL: 30 (ref 12–20)
BUN/CREAT SERPL: 31 (ref 12–20)
BUN/CREAT SERPL: 31 (ref 12–20)
BUN/CREAT SERPL: 33 (ref 12–20)
BUN/CREAT SERPL: 35 (ref 12–20)
BUN/CREAT SERPL: 36 (ref 12–20)
CA-I SERPL-SCNC: 1.09 MMOL/L (ref 1.12–1.32)
CA-I SERPL-SCNC: 1.1 MMOL/L (ref 1.12–1.32)
CA-I SERPL-SCNC: 1.13 MMOL/L (ref 1.12–1.32)
CALCIUM SERPL-MCNC: 7.7 MG/DL (ref 8.5–10.1)
CALCIUM SERPL-MCNC: 7.8 MG/DL (ref 8.5–10.1)
CALCIUM SERPL-MCNC: 7.9 MG/DL (ref 8.5–10.1)
CALCIUM SERPL-MCNC: 8 MG/DL (ref 8.5–10.1)
CALCIUM SERPL-MCNC: 8.1 MG/DL (ref 8.5–10.1)
CALCIUM SERPL-MCNC: 8.2 MG/DL (ref 8.5–10.1)
CALCIUM SERPL-MCNC: 8.3 MG/DL (ref 8.5–10.1)
CALCIUM SERPL-MCNC: 8.4 MG/DL (ref 8.5–10.1)
CALCIUM SERPL-MCNC: 8.7 MG/DL (ref 8.5–10.1)
CALCIUM SERPL-MCNC: 8.8 MG/DL (ref 8.5–10.1)
CALCULATED R AXIS, ECG10: -29 DEGREES
CALCULATED R AXIS, ECG10: -35 DEGREES
CALCULATED R AXIS, ECG10: -39 DEGREES
CALCULATED T AXIS, ECG11: -96 DEGREES
CALCULATED T AXIS, ECG11: 111 DEGREES
CALCULATED T AXIS, ECG11: 79 DEGREES
CHLORIDE SERPL-SCNC: 114 MMOL/L (ref 100–111)
CHLORIDE SERPL-SCNC: 117 MMOL/L (ref 100–111)
CHLORIDE SERPL-SCNC: 121 MMOL/L (ref 100–111)
CHLORIDE SERPL-SCNC: 123 MMOL/L (ref 100–111)
CHLORIDE SERPL-SCNC: 127 MMOL/L (ref 100–111)
CHLORIDE SERPL-SCNC: 131 MMOL/L (ref 100–111)
CHLORIDE SERPL-SCNC: 131 MMOL/L (ref 100–111)
CHLORIDE SERPL-SCNC: 132 MMOL/L (ref 100–111)
CHLORIDE SERPL-SCNC: 135 MMOL/L (ref 100–111)
CHLORIDE SERPL-SCNC: 135 MMOL/L (ref 100–111)
CHLORIDE SERPL-SCNC: 137 MMOL/L (ref 100–111)
CHLORIDE SERPL-SCNC: 138 MMOL/L (ref 100–111)
CHLORIDE SERPL-SCNC: 139 MMOL/L (ref 100–111)
CK MB CFR SERPL CALC: 0.3 % (ref 0–4)
CK MB CFR SERPL CALC: ABNORMAL % (ref 0–4)
CK MB SERPL-MCNC: 1.1 NG/ML (ref 5–25)
CK MB SERPL-MCNC: 1.3 NG/ML (ref 5–25)
CK MB SERPL-MCNC: 1.4 NG/ML (ref 5–25)
CK MB SERPL-MCNC: <1 NG/ML (ref 5–25)
CK SERPL-CCNC: 349 U/L (ref 39–308)
CK SERPL-CCNC: 389 U/L (ref 39–308)
CK SERPL-CCNC: 392 U/L (ref 39–308)
CK SERPL-CCNC: 467 U/L (ref 39–308)
CO2 SERPL-SCNC: 19 MMOL/L (ref 21–32)
CO2 SERPL-SCNC: 20 MMOL/L (ref 21–32)
CO2 SERPL-SCNC: 22 MMOL/L (ref 21–32)
CO2 SERPL-SCNC: 23 MMOL/L (ref 21–32)
CO2 SERPL-SCNC: 24 MMOL/L (ref 21–32)
CO2 SERPL-SCNC: 25 MMOL/L (ref 21–32)
CO2 SERPL-SCNC: 27 MMOL/L (ref 21–32)
COLOR UR: YELLOW
CREAT SERPL-MCNC: 0.9 MG/DL (ref 0.6–1.3)
CREAT SERPL-MCNC: 0.9 MG/DL (ref 0.6–1.3)
CREAT SERPL-MCNC: 0.95 MG/DL (ref 0.6–1.3)
CREAT SERPL-MCNC: 0.95 MG/DL (ref 0.6–1.3)
CREAT SERPL-MCNC: 0.98 MG/DL (ref 0.6–1.3)
CREAT SERPL-MCNC: 0.99 MG/DL (ref 0.6–1.3)
CREAT SERPL-MCNC: 1.04 MG/DL (ref 0.6–1.3)
CREAT SERPL-MCNC: 1.07 MG/DL (ref 0.6–1.3)
CREAT SERPL-MCNC: 1.13 MG/DL (ref 0.6–1.3)
CREAT SERPL-MCNC: 1.16 MG/DL (ref 0.6–1.3)
CREAT SERPL-MCNC: 1.21 MG/DL (ref 0.6–1.3)
CREAT SERPL-MCNC: 1.36 MG/DL (ref 0.6–1.3)
CREAT SERPL-MCNC: 1.87 MG/DL (ref 0.6–1.3)
DATE LAST DOSE: NORMAL
DIAGNOSIS, 93000: NORMAL
DIFFERENTIAL METHOD BLD: ABNORMAL
ECHO AO ASC DIAM: 3.35 CM
ECHO AO ROOT DIAM: 3.51 CM
ECHO EST RA PRESSURE: 15 MMHG
ECHO IVC SNIFF: 2.77 CM
ECHO LA MAJOR AXIS: 3.27 CM
ECHO LA TO AORTIC ROOT RATIO: 0.93
ECHO LV EDV TEICHHOLZ: 0.55 ML
ECHO LV ESV TEICHHOLZ: 0.19 ML
ECHO LV INTERNAL DIMENSION DIASTOLIC: 4.65 CM (ref 4.2–5.9)
ECHO LV INTERNAL DIMENSION SYSTOLIC: 2.98 CM
ECHO LV IVSD: 1.09 CM (ref 0.6–1)
ECHO LV MASS 2D: 213.8 G (ref 88–224)
ECHO LV MASS INDEX 2D: 108.6 G/M2 (ref 49–115)
ECHO LV POSTERIOR WALL DIASTOLIC: 1.09 CM (ref 0.6–1)
ECHO LVOT DIAM: 2.12 CM
ECHO PULMONARY ARTERY SYSTOLIC PRESSURE (PASP): 47.1 MMHG
ECHO RIGHT VENTRICULAR SYSTOLIC PRESSURE (RVSP): 47.1 MMHG
ECHO TV REGURGITANT MAX VELOCITY: 283.36 CM/S
ECHO TV REGURGITANT PEAK GRADIENT: 32.1 MMHG
EOSINOPHIL # BLD: 0 K/UL (ref 0–0.4)
EOSINOPHIL # BLD: 0.1 K/UL (ref 0–0.4)
EOSINOPHIL # BLD: 0.2 K/UL (ref 0–0.4)
EOSINOPHIL # BLD: 0.3 K/UL (ref 0–0.4)
EOSINOPHIL # BLD: 0.3 K/UL (ref 0–0.4)
EOSINOPHIL NFR BLD: 0 % (ref 0–5)
EOSINOPHIL NFR BLD: 0 % (ref 0–5)
EOSINOPHIL NFR BLD: 1 % (ref 0–5)
EOSINOPHIL NFR BLD: 2 % (ref 0–5)
EOSINOPHIL NFR BLD: 2 % (ref 0–5)
ERYTHROCYTE [DISTWIDTH] IN BLOOD BY AUTOMATED COUNT: 13.6 % (ref 11.6–14.5)
ERYTHROCYTE [DISTWIDTH] IN BLOOD BY AUTOMATED COUNT: 14.1 % (ref 11.6–14.5)
ERYTHROCYTE [DISTWIDTH] IN BLOOD BY AUTOMATED COUNT: 14.2 % (ref 11.6–14.5)
ERYTHROCYTE [DISTWIDTH] IN BLOOD BY AUTOMATED COUNT: 14.2 % (ref 11.6–14.5)
ERYTHROCYTE [DISTWIDTH] IN BLOOD BY AUTOMATED COUNT: 14.3 % (ref 11.6–14.5)
FLUAV AG NPH QL IA: NEGATIVE
FLUBV AG NOSE QL IA: NEGATIVE
GLOBULIN SER CALC-MCNC: 4.6 G/DL (ref 2–4)
GLUCOSE BLD STRIP.AUTO-MCNC: 102 MG/DL (ref 70–110)
GLUCOSE BLD STRIP.AUTO-MCNC: 85 MG/DL (ref 70–110)
GLUCOSE SERPL-MCNC: 101 MG/DL (ref 74–99)
GLUCOSE SERPL-MCNC: 107 MG/DL (ref 74–99)
GLUCOSE SERPL-MCNC: 108 MG/DL (ref 74–99)
GLUCOSE SERPL-MCNC: 109 MG/DL (ref 74–99)
GLUCOSE SERPL-MCNC: 109 MG/DL (ref 74–99)
GLUCOSE SERPL-MCNC: 110 MG/DL (ref 74–99)
GLUCOSE SERPL-MCNC: 113 MG/DL (ref 74–99)
GLUCOSE SERPL-MCNC: 119 MG/DL (ref 74–99)
GLUCOSE SERPL-MCNC: 120 MG/DL (ref 74–99)
GLUCOSE SERPL-MCNC: 121 MG/DL (ref 74–99)
GLUCOSE SERPL-MCNC: 123 MG/DL (ref 74–99)
GLUCOSE SERPL-MCNC: 127 MG/DL (ref 74–99)
GLUCOSE SERPL-MCNC: 135 MG/DL (ref 74–99)
GLUCOSE SERPL-MCNC: 139 MG/DL (ref 74–99)
GLUCOSE SERPL-MCNC: 98 MG/DL (ref 74–99)
GLUCOSE UR STRIP.AUTO-MCNC: NEGATIVE MG/DL
GRAM STN SPEC: ABNORMAL
HCT VFR BLD AUTO: 35.7 % (ref 36–48)
HCT VFR BLD AUTO: 38.2 % (ref 36–48)
HCT VFR BLD AUTO: 39.7 % (ref 36–48)
HCT VFR BLD AUTO: 42.1 % (ref 36–48)
HCT VFR BLD AUTO: 47.1 % (ref 36–48)
HGB BLD-MCNC: 10.9 G/DL (ref 13–16)
HGB BLD-MCNC: 11.5 G/DL (ref 13–16)
HGB BLD-MCNC: 12.6 G/DL (ref 13–16)
HGB BLD-MCNC: 12.6 G/DL (ref 13–16)
HGB BLD-MCNC: 14.4 G/DL (ref 13–16)
HGB UR QL STRIP: ABNORMAL
KETONES UR QL STRIP.AUTO: NEGATIVE MG/DL
LACTATE BLD-SCNC: 1.46 MMOL/L (ref 0.4–2)
LEUKOCYTE ESTERASE UR QL STRIP.AUTO: ABNORMAL
LIPASE SERPL-CCNC: 252 U/L (ref 73–393)
LVFS 2D: 36.02 %
LVSV (TEICH): 33.56 ML
LYMPHOCYTES # BLD: 1.3 K/UL (ref 0.9–3.6)
LYMPHOCYTES # BLD: 1.4 K/UL (ref 0.9–3.6)
LYMPHOCYTES # BLD: 2 K/UL (ref 0.9–3.6)
LYMPHOCYTES NFR BLD: 10 % (ref 21–52)
LYMPHOCYTES NFR BLD: 8 % (ref 21–52)
MAGNESIUM SERPL-MCNC: 2.3 MG/DL (ref 1.6–2.6)
MAGNESIUM SERPL-MCNC: 2.3 MG/DL (ref 1.6–2.6)
MAGNESIUM SERPL-MCNC: 2.4 MG/DL (ref 1.6–2.6)
MCH RBC QN AUTO: 31.2 PG (ref 24–34)
MCH RBC QN AUTO: 31.5 PG (ref 24–34)
MCH RBC QN AUTO: 31.6 PG (ref 24–34)
MCH RBC QN AUTO: 31.8 PG (ref 24–34)
MCH RBC QN AUTO: 32.3 PG (ref 24–34)
MCHC RBC AUTO-ENTMCNC: 29.9 G/DL (ref 31–37)
MCHC RBC AUTO-ENTMCNC: 30.1 G/DL (ref 31–37)
MCHC RBC AUTO-ENTMCNC: 30.5 G/DL (ref 31–37)
MCHC RBC AUTO-ENTMCNC: 30.6 G/DL (ref 31–37)
MCHC RBC AUTO-ENTMCNC: 31.7 G/DL (ref 31–37)
MCV RBC AUTO: 100.3 FL (ref 74–97)
MCV RBC AUTO: 102.3 FL (ref 74–97)
MCV RBC AUTO: 104.7 FL (ref 74–97)
MCV RBC AUTO: 105.5 FL (ref 74–97)
MCV RBC AUTO: 105.6 FL (ref 74–97)
MONOCYTES # BLD: 0.9 K/UL (ref 0.05–1.2)
MONOCYTES # BLD: 1 K/UL (ref 0.05–1.2)
MONOCYTES # BLD: 1.1 K/UL (ref 0.05–1.2)
MONOCYTES NFR BLD: 5 % (ref 3–10)
MONOCYTES NFR BLD: 6 % (ref 3–10)
NEUTS SEG # BLD: 12.7 K/UL (ref 1.8–8)
NEUTS SEG # BLD: 12.9 K/UL (ref 1.8–8)
NEUTS SEG # BLD: 13.6 K/UL (ref 1.8–8)
NEUTS SEG # BLD: 15.7 K/UL (ref 1.8–8)
NEUTS SEG # BLD: 17.4 K/UL (ref 1.8–8)
NEUTS SEG NFR BLD: 84 % (ref 40–73)
NEUTS SEG NFR BLD: 85 % (ref 40–73)
NEUTS SEG NFR BLD: 87 % (ref 40–73)
NITRITE UR QL STRIP.AUTO: POSITIVE
PH UR STRIP: 5 [PH] (ref 5–8)
PHOSPHATE SERPL-MCNC: 2.6 MG/DL (ref 2.5–4.9)
PHOSPHATE SERPL-MCNC: 2.9 MG/DL (ref 2.5–4.9)
PHOSPHATE SERPL-MCNC: 3.2 MG/DL (ref 2.5–4.9)
PLATELET # BLD AUTO: 232 K/UL (ref 135–420)
PLATELET # BLD AUTO: 235 K/UL (ref 135–420)
PLATELET # BLD AUTO: 236 K/UL (ref 135–420)
PLATELET # BLD AUTO: 246 K/UL (ref 135–420)
PLATELET # BLD AUTO: 264 K/UL (ref 135–420)
PMV BLD AUTO: 11.1 FL (ref 9.2–11.8)
PMV BLD AUTO: 11.3 FL (ref 9.2–11.8)
PMV BLD AUTO: 11.4 FL (ref 9.2–11.8)
POTASSIUM SERPL-SCNC: 3.7 MMOL/L (ref 3.5–5.5)
POTASSIUM SERPL-SCNC: 3.7 MMOL/L (ref 3.5–5.5)
POTASSIUM SERPL-SCNC: 3.8 MMOL/L (ref 3.5–5.5)
POTASSIUM SERPL-SCNC: 3.9 MMOL/L (ref 3.5–5.5)
POTASSIUM SERPL-SCNC: 4 MMOL/L (ref 3.5–5.5)
POTASSIUM SERPL-SCNC: 4.2 MMOL/L (ref 3.5–5.5)
POTASSIUM SERPL-SCNC: 4.6 MMOL/L (ref 3.5–5.5)
PROT SERPL-MCNC: 7.3 G/DL (ref 6.4–8.2)
PROT UR STRIP-MCNC: 100 MG/DL
Q-T INTERVAL, ECG07: 252 MS
Q-T INTERVAL, ECG07: 280 MS
Q-T INTERVAL, ECG07: 322 MS
QRS DURATION, ECG06: 90 MS
QRS DURATION, ECG06: 90 MS
QRS DURATION, ECG06: 92 MS
QTC CALCULATION (BEZET), ECG08: 407 MS
QTC CALCULATION (BEZET), ECG08: 459 MS
QTC CALCULATION (BEZET), ECG08: 473 MS
RBC # BLD AUTO: 3.49 M/UL (ref 4.7–5.5)
RBC # BLD AUTO: 3.65 M/UL (ref 4.7–5.5)
RBC # BLD AUTO: 3.96 M/UL (ref 4.7–5.5)
RBC # BLD AUTO: 3.99 M/UL (ref 4.7–5.5)
RBC # BLD AUTO: 4.46 M/UL (ref 4.7–5.5)
RBC #/AREA URNS HPF: ABNORMAL /HPF (ref 0–5)
REPORTED DOSE,DOSE: NORMAL UNITS
REPORTED DOSE/TIME,TMG: 2000
SERVICE CMNT-IMP: ABNORMAL
SERVICE CMNT-IMP: ABNORMAL
SERVICE CMNT-IMP: NORMAL
SODIUM SERPL-SCNC: 144 MMOL/L (ref 136–145)
SODIUM SERPL-SCNC: 149 MMOL/L (ref 136–145)
SODIUM SERPL-SCNC: 152 MMOL/L (ref 136–145)
SODIUM SERPL-SCNC: 153 MMOL/L (ref 136–145)
SODIUM SERPL-SCNC: 158 MMOL/L (ref 136–145)
SODIUM SERPL-SCNC: 158 MMOL/L (ref 136–145)
SODIUM SERPL-SCNC: 159 MMOL/L (ref 136–145)
SODIUM SERPL-SCNC: 160 MMOL/L (ref 136–145)
SODIUM SERPL-SCNC: 165 MMOL/L (ref 136–145)
SODIUM SERPL-SCNC: 166 MMOL/L (ref 136–145)
SODIUM SERPL-SCNC: 167 MMOL/L (ref 136–145)
SODIUM SERPL-SCNC: 168 MMOL/L (ref 136–145)
SP GR UR REFRACTOMETRY: 1.02 (ref 1–1.03)
TROPONIN I SERPL-MCNC: 0.02 NG/ML (ref 0–0.04)
TROPONIN I SERPL-MCNC: 0.02 NG/ML (ref 0–0.04)
TROPONIN I SERPL-MCNC: 0.03 NG/ML (ref 0–0.04)
TROPONIN I SERPL-MCNC: <0.02 NG/ML (ref 0–0.04)
UROBILINOGEN UR QL STRIP.AUTO: 0.2 EU/DL (ref 0.2–1)
VANCOMYCIN TROUGH SERPL-MCNC: 12.4 UG/ML (ref 10–20)
VENTRICULAR RATE, ECG03: 130 BPM
VENTRICULAR RATE, ECG03: 157 BPM
VENTRICULAR RATE, ECG03: 162 BPM
WBC # BLD AUTO: 15.2 K/UL (ref 4.6–13.2)
WBC # BLD AUTO: 15.2 K/UL (ref 4.6–13.2)
WBC # BLD AUTO: 16.1 K/UL (ref 4.6–13.2)
WBC # BLD AUTO: 18 K/UL (ref 4.6–13.2)
WBC # BLD AUTO: 20.6 K/UL (ref 4.6–13.2)
WBC URNS QL MICRO: ABNORMAL /HPF (ref 0–4)

## 2020-01-01 PROCEDURE — 82550 ASSAY OF CK (CPK): CPT

## 2020-01-01 PROCEDURE — G0299 HHS/HOSPICE OF RN EA 15 MIN: HCPCS

## 2020-01-01 PROCEDURE — 3331090001 HH PPS REVENUE CREDIT

## 2020-01-01 PROCEDURE — 74011250636 HC RX REV CODE- 250/636: Performed by: HOSPITALIST

## 2020-01-01 PROCEDURE — 3331090002 HH PPS REVENUE DEBIT

## 2020-01-01 PROCEDURE — 80048 BASIC METABOLIC PNL TOTAL CA: CPT

## 2020-01-01 PROCEDURE — 92526 ORAL FUNCTION THERAPY: CPT

## 2020-01-01 PROCEDURE — 87070 CULTURE OTHR SPECIMN AEROBIC: CPT

## 2020-01-01 PROCEDURE — 65660000001 HC RM ICU INTERMED STEPDOWN

## 2020-01-01 PROCEDURE — 0651 HSPC ROUTINE HOME CARE

## 2020-01-01 PROCEDURE — 93306 TTE W/DOPPLER COMPLETE: CPT

## 2020-01-01 PROCEDURE — 74011000250 HC RX REV CODE- 250: Performed by: HOSPITALIST

## 2020-01-01 PROCEDURE — T4524 ADULT SIZE BRIEF/DIAPER XL: HCPCS

## 2020-01-01 PROCEDURE — 87804 INFLUENZA ASSAY W/OPTIC: CPT

## 2020-01-01 PROCEDURE — 81001 URINALYSIS AUTO W/SCOPE: CPT

## 2020-01-01 PROCEDURE — 85025 COMPLETE CBC W/AUTO DIFF WBC: CPT

## 2020-01-01 PROCEDURE — G0152 HHCP-SERV OF OT,EA 15 MIN: HCPCS

## 2020-01-01 PROCEDURE — T4541 LARGE DISPOSABLE UNDERPAD: HCPCS

## 2020-01-01 PROCEDURE — 82330 ASSAY OF CALCIUM: CPT

## 2020-01-01 PROCEDURE — A6213 FOAM DRG >16<=48 SQ IN W/BDR: HCPCS

## 2020-01-01 PROCEDURE — 70450 CT HEAD/BRAIN W/O DYE: CPT

## 2020-01-01 PROCEDURE — 74011250636 HC RX REV CODE- 250/636: Performed by: EMERGENCY MEDICINE

## 2020-01-01 PROCEDURE — G0155 HHCP-SVS OF CSW,EA 15 MIN: HCPCS

## 2020-01-01 PROCEDURE — 83880 ASSAY OF NATRIURETIC PEPTIDE: CPT

## 2020-01-01 PROCEDURE — 94762 N-INVAS EAR/PLS OXIMTRY CONT: CPT

## 2020-01-01 PROCEDURE — 74011000255 HC RX REV CODE- 255: Performed by: HOSPITALIST

## 2020-01-01 PROCEDURE — 74011250637 HC RX REV CODE- 250/637: Performed by: EMERGENCY MEDICINE

## 2020-01-01 PROCEDURE — 83605 ASSAY OF LACTIC ACID: CPT

## 2020-01-01 PROCEDURE — 74011250637 HC RX REV CODE- 250/637: Performed by: HOSPITALIST

## 2020-01-01 PROCEDURE — 36415 COLL VENOUS BLD VENIPUNCTURE: CPT

## 2020-01-01 PROCEDURE — 74011250636 HC RX REV CODE- 250/636: Performed by: PHYSICIAN ASSISTANT

## 2020-01-01 PROCEDURE — 400013 HH SOC

## 2020-01-01 PROCEDURE — 84100 ASSAY OF PHOSPHORUS: CPT

## 2020-01-01 PROCEDURE — 87040 BLOOD CULTURE FOR BACTERIA: CPT

## 2020-01-01 PROCEDURE — 74011000258 HC RX REV CODE- 258: Performed by: HOSPITALIST

## 2020-01-01 PROCEDURE — 3331090004 HSPC SERVICE INTENSITY ADD-ON

## 2020-01-01 PROCEDURE — G0151 HHCP-SERV OF PT,EA 15 MIN: HCPCS

## 2020-01-01 PROCEDURE — A6250 SKIN SEAL PROTECT MOISTURIZR: HCPCS

## 2020-01-01 PROCEDURE — 97167 OT EVAL HIGH COMPLEX 60 MIN: CPT

## 2020-01-01 PROCEDURE — 97162 PT EVAL MOD COMPLEX 30 MIN: CPT

## 2020-01-01 PROCEDURE — A9270 NON-COVERED ITEM OR SERVICE: HCPCS

## 2020-01-01 PROCEDURE — 99285 EMERGENCY DEPT VISIT HI MDM: CPT

## 2020-01-01 PROCEDURE — 92610 EVALUATE SWALLOWING FUNCTION: CPT

## 2020-01-01 PROCEDURE — 83735 ASSAY OF MAGNESIUM: CPT

## 2020-01-01 PROCEDURE — 96360 HYDRATION IV INFUSION INIT: CPT

## 2020-01-01 PROCEDURE — 97530 THERAPEUTIC ACTIVITIES: CPT

## 2020-01-01 PROCEDURE — HHS10554 SHAMPOO/BODY WASH 8 OZ ALOE VESTA

## 2020-01-01 PROCEDURE — 82140 ASSAY OF AMMONIA: CPT

## 2020-01-01 PROCEDURE — 71045 X-RAY EXAM CHEST 1 VIEW: CPT

## 2020-01-01 PROCEDURE — 93005 ELECTROCARDIOGRAM TRACING: CPT

## 2020-01-01 PROCEDURE — 96361 HYDRATE IV INFUSION ADD-ON: CPT

## 2020-01-01 PROCEDURE — 82962 GLUCOSE BLOOD TEST: CPT

## 2020-01-01 PROCEDURE — 87077 CULTURE AEROBIC IDENTIFY: CPT

## 2020-01-01 PROCEDURE — 92611 MOTION FLUOROSCOPY/SWALLOW: CPT

## 2020-01-01 PROCEDURE — 80053 COMPREHEN METABOLIC PANEL: CPT

## 2020-01-01 PROCEDURE — 74011250636 HC RX REV CODE- 250/636

## 2020-01-01 PROCEDURE — 80202 ASSAY OF VANCOMYCIN: CPT

## 2020-01-01 PROCEDURE — 83690 ASSAY OF LIPASE: CPT

## 2020-01-01 PROCEDURE — 74230 X-RAY XM SWLNG FUNCJ C+: CPT

## 2020-01-01 PROCEDURE — 87186 SC STD MICRODIL/AGAR DIL: CPT

## 2020-01-01 PROCEDURE — G0156 HHCP-SVS OF AIDE,EA 15 MIN: HCPCS

## 2020-01-01 PROCEDURE — 74011000250 HC RX REV CODE- 250: Performed by: EMERGENCY MEDICINE

## 2020-01-01 PROCEDURE — 97166 OT EVAL MOD COMPLEX 45 MIN: CPT

## 2020-01-01 PROCEDURE — 3336500001 HSPC ELECTION

## 2020-01-01 PROCEDURE — A6209 FOAM DRSG <=16 SQ IN W/O BDR: HCPCS

## 2020-01-01 PROCEDURE — 89220 SPUTUM SPECIMEN COLLECTION: CPT

## 2020-01-01 PROCEDURE — 87086 URINE CULTURE/COLONY COUNT: CPT

## 2020-01-01 RX ORDER — METOPROLOL SUCCINATE 50 MG/1
50 TABLET, EXTENDED RELEASE ORAL
Status: COMPLETED | OUTPATIENT
Start: 2020-01-01 | End: 2020-01-01

## 2020-01-01 RX ORDER — MORPHINE SULFATE 20 MG/ML
10 SOLUTION ORAL
Qty: 30 ML | Refills: 0 | Status: SHIPPED
Start: 2020-01-01 | End: 2020-01-01 | Stop reason: SDUPTHER

## 2020-01-01 RX ORDER — MORPHINE SULFATE 20 MG/ML
10 SOLUTION ORAL
Qty: 30 ML | Refills: 0 | Status: SHIPPED | OUTPATIENT
Start: 2020-01-01 | End: 2020-01-01

## 2020-01-01 RX ORDER — POTASSIUM CHLORIDE 7.45 MG/ML
10 INJECTION INTRAVENOUS ONCE
Status: COMPLETED | OUTPATIENT
Start: 2020-01-01 | End: 2020-01-01

## 2020-01-01 RX ORDER — DILTIAZEM HYDROCHLORIDE 5 MG/ML
5 INJECTION INTRAVENOUS
Status: COMPLETED | OUTPATIENT
Start: 2020-01-01 | End: 2020-01-01

## 2020-01-01 RX ORDER — SODIUM CHLORIDE 0.9 % (FLUSH) 0.9 %
5-10 SYRINGE (ML) INJECTION AS NEEDED
Status: DISCONTINUED | OUTPATIENT
Start: 2020-01-01 | End: 2020-01-01 | Stop reason: HOSPADM

## 2020-01-01 RX ORDER — POTASSIUM CHLORIDE 7.45 MG/ML
10 INJECTION INTRAVENOUS
Status: COMPLETED | OUTPATIENT
Start: 2020-01-01 | End: 2020-01-01

## 2020-01-01 RX ORDER — NALOXONE HYDROCHLORIDE 0.4 MG/ML
0.4 INJECTION, SOLUTION INTRAMUSCULAR; INTRAVENOUS; SUBCUTANEOUS AS NEEDED
Status: DISCONTINUED | OUTPATIENT
Start: 2020-01-01 | End: 2020-01-01 | Stop reason: HOSPADM

## 2020-01-01 RX ORDER — VANCOMYCIN 2 GRAM/500 ML IN 0.9 % SODIUM CHLORIDE INTRAVENOUS
2000 ONCE
Status: COMPLETED | OUTPATIENT
Start: 2020-01-01 | End: 2020-01-01

## 2020-01-01 RX ORDER — DEXTROSE MONOHYDRATE 50 MG/ML
100 INJECTION, SOLUTION INTRAVENOUS CONTINUOUS
Status: DISCONTINUED | OUTPATIENT
Start: 2020-01-01 | End: 2020-01-01

## 2020-01-01 RX ORDER — LEVOFLOXACIN 5 MG/ML
750 INJECTION, SOLUTION INTRAVENOUS EVERY 24 HOURS
Status: DISCONTINUED | OUTPATIENT
Start: 2020-01-01 | End: 2020-01-01 | Stop reason: DRUGHIGH

## 2020-01-01 RX ORDER — METOPROLOL TARTRATE 5 MG/5ML
5 INJECTION INTRAVENOUS
Status: COMPLETED | OUTPATIENT
Start: 2020-01-01 | End: 2020-01-01

## 2020-01-01 RX ORDER — LORAZEPAM 2 MG/ML
1 CONCENTRATE ORAL
Status: DISCONTINUED | OUTPATIENT
Start: 2020-01-01 | End: 2020-01-01 | Stop reason: HOSPADM

## 2020-01-01 RX ORDER — LEVOFLOXACIN 5 MG/ML
750 INJECTION, SOLUTION INTRAVENOUS EVERY 24 HOURS
Status: DISCONTINUED | OUTPATIENT
Start: 2020-01-01 | End: 2020-01-01 | Stop reason: HOSPADM

## 2020-01-01 RX ORDER — MORPHINE SULFATE 20 MG/ML
10 SOLUTION ORAL
Status: DISCONTINUED | OUTPATIENT
Start: 2020-01-01 | End: 2020-01-01 | Stop reason: HOSPADM

## 2020-01-01 RX ORDER — POTASSIUM CHLORIDE 7.45 MG/ML
INJECTION INTRAVENOUS
Status: COMPLETED
Start: 2020-01-01 | End: 2020-01-01

## 2020-01-01 RX ORDER — MELATONIN 5 MG
10 CAPSULE ORAL
Qty: 10 CAP | Refills: 0 | Status: SHIPPED | OUTPATIENT
Start: 2020-01-01

## 2020-01-01 RX ORDER — LEVOFLOXACIN 5 MG/ML
750 INJECTION, SOLUTION INTRAVENOUS
Status: DISCONTINUED | OUTPATIENT
Start: 2020-01-01 | End: 2020-01-01

## 2020-01-01 RX ORDER — ENOXAPARIN SODIUM 100 MG/ML
40 INJECTION SUBCUTANEOUS EVERY 24 HOURS
Status: DISCONTINUED | OUTPATIENT
Start: 2020-01-01 | End: 2020-01-01 | Stop reason: HOSPADM

## 2020-01-01 RX ORDER — KETOTIFEN FUMARATE 0.35 MG/ML
1 SOLUTION/ DROPS OPHTHALMIC 2 TIMES DAILY
Qty: 10 ML | Refills: 0 | Status: SHIPPED | OUTPATIENT
Start: 2020-01-01

## 2020-01-01 RX ORDER — SODIUM CHLORIDE 9 MG/ML
75 INJECTION, SOLUTION INTRAVENOUS CONTINUOUS
Status: DISCONTINUED | OUTPATIENT
Start: 2020-01-01 | End: 2020-01-01

## 2020-01-01 RX ORDER — CEFTRIAXONE 1 G/1
1 INJECTION, POWDER, FOR SOLUTION INTRAMUSCULAR; INTRAVENOUS
Status: COMPLETED | OUTPATIENT
Start: 2020-01-01 | End: 2020-01-01

## 2020-01-01 RX ORDER — SODIUM CHLORIDE 9 MG/ML
1000 INJECTION, SOLUTION INTRAVENOUS CONTINUOUS
Status: DISPENSED | OUTPATIENT
Start: 2020-01-01 | End: 2020-01-01

## 2020-01-01 RX ORDER — LORAZEPAM 2 MG/ML
1 CONCENTRATE ORAL
Qty: 30 ML | Refills: 0 | Status: SHIPPED | OUTPATIENT
Start: 2020-01-01

## 2020-01-01 RX ORDER — QUETIAPINE FUMARATE 25 MG/1
25 TABLET, FILM COATED ORAL DAILY
Status: DISCONTINUED | OUTPATIENT
Start: 2020-01-01 | End: 2020-01-01 | Stop reason: HOSPADM

## 2020-01-01 RX ORDER — DEXTROSE MONOHYDRATE AND SODIUM CHLORIDE 5; .45 G/100ML; G/100ML
75 INJECTION, SOLUTION INTRAVENOUS CONTINUOUS
Status: DISCONTINUED | OUTPATIENT
Start: 2020-01-01 | End: 2020-01-01

## 2020-01-01 RX ADMIN — ENOXAPARIN SODIUM 40 MG: 40 INJECTION SUBCUTANEOUS at 00:20

## 2020-01-01 RX ADMIN — SODIUM CHLORIDE 1000 ML/HR: 900 INJECTION, SOLUTION INTRAVENOUS at 19:35

## 2020-01-01 RX ADMIN — DEXTROSE MONOHYDRATE 10 MG/HR: 50 INJECTION, SOLUTION INTRAVENOUS at 10:52

## 2020-01-01 RX ADMIN — DEXTROSE MONOHYDRATE 1250 MG: 5 INJECTION, SOLUTION INTRAVENOUS at 07:42

## 2020-01-01 RX ADMIN — METOPROLOL TARTRATE 5 MG: 5 INJECTION INTRAVENOUS at 13:01

## 2020-01-01 RX ADMIN — CEFTRIAXONE 1 G: 1 INJECTION, POWDER, FOR SOLUTION INTRAMUSCULAR; INTRAVENOUS at 11:12

## 2020-01-01 RX ADMIN — POTASSIUM CHLORIDE 10 MEQ: 7.46 INJECTION, SOLUTION INTRAVENOUS at 04:57

## 2020-01-01 RX ADMIN — LORAZEPAM 1 MG: 2 SOLUTION, CONCENTRATE ORAL at 15:35

## 2020-01-01 RX ADMIN — SODIUM CHLORIDE 1000 ML: 900 INJECTION, SOLUTION INTRAVENOUS at 10:00

## 2020-01-01 RX ADMIN — DEXTROSE MONOHYDRATE 10 MG/HR: 50 INJECTION, SOLUTION INTRAVENOUS at 05:01

## 2020-01-01 RX ADMIN — SODIUM CHLORIDE 1000 ML: 900 INJECTION, SOLUTION INTRAVENOUS at 08:50

## 2020-01-01 RX ADMIN — DEXTROSE MONOHYDRATE AND SODIUM CHLORIDE 75 ML/HR: 5; .45 INJECTION, SOLUTION INTRAVENOUS at 06:37

## 2020-01-01 RX ADMIN — CEFTRIAXONE SODIUM 1 G: 1 INJECTION, POWDER, FOR SOLUTION INTRAMUSCULAR; INTRAVENOUS at 10:41

## 2020-01-01 RX ADMIN — DEXTROSE MONOHYDRATE 10 MG/HR: 50 INJECTION, SOLUTION INTRAVENOUS at 20:31

## 2020-01-01 RX ADMIN — SODIUM CHLORIDE 15 MG/HR: 900 INJECTION, SOLUTION INTRAVENOUS at 20:07

## 2020-01-01 RX ADMIN — DEXTROSE MONOHYDRATE 75 ML/HR: 5 INJECTION, SOLUTION INTRAVENOUS at 18:39

## 2020-01-01 RX ADMIN — SODIUM CHLORIDE 1000 MG: 900 INJECTION, SOLUTION INTRAVENOUS at 02:52

## 2020-01-01 RX ADMIN — DEXTROSE MONOHYDRATE 15 MG/HR: 50 INJECTION, SOLUTION INTRAVENOUS at 02:51

## 2020-01-01 RX ADMIN — SODIUM CHLORIDE 259 ML: 900 INJECTION, SOLUTION INTRAVENOUS at 13:28

## 2020-01-01 RX ADMIN — DEXTROSE MONOHYDRATE 10 MG/HR: 50 INJECTION, SOLUTION INTRAVENOUS at 20:58

## 2020-01-01 RX ADMIN — CEFTRIAXONE SODIUM 1 G: 1 INJECTION, POWDER, FOR SOLUTION INTRAMUSCULAR; INTRAVENOUS at 13:57

## 2020-01-01 RX ADMIN — DEXTROSE MONOHYDRATE 1000 MG: 50 INJECTION, SOLUTION INTRAVENOUS at 14:29

## 2020-01-01 RX ADMIN — DEXTROSE MONOHYDRATE 75 ML/HR: 5 INJECTION, SOLUTION INTRAVENOUS at 00:20

## 2020-01-01 RX ADMIN — LEVOFLOXACIN 750 MG: 5 INJECTION, SOLUTION INTRAVENOUS at 07:42

## 2020-01-01 RX ADMIN — CEFTRIAXONE 1 G: 1 INJECTION, POWDER, FOR SOLUTION INTRAMUSCULAR; INTRAVENOUS at 11:07

## 2020-01-01 RX ADMIN — POTASSIUM CHLORIDE 10 MEQ: 7.46 INJECTION, SOLUTION INTRAVENOUS at 04:14

## 2020-01-01 RX ADMIN — BARIUM SULFATE 15 ML: 400 SUSPENSION ORAL at 11:38

## 2020-01-01 RX ADMIN — METOPROLOL TARTRATE 5 MG: 5 INJECTION INTRAVENOUS at 15:51

## 2020-01-01 RX ADMIN — VANCOMYCIN HYDROCHLORIDE 2000 MG: 10 INJECTION, POWDER, LYOPHILIZED, FOR SOLUTION INTRAVENOUS at 07:57

## 2020-01-01 RX ADMIN — DEXTROSE MONOHYDRATE 1000 MG: 50 INJECTION, SOLUTION INTRAVENOUS at 20:00

## 2020-01-01 RX ADMIN — DEXTROSE MONOHYDRATE 50 ML/HR: 5 INJECTION, SOLUTION INTRAVENOUS at 22:20

## 2020-01-01 RX ADMIN — BARIUM SULFATE 370 ML: 0.81 POWDER, FOR SUSPENSION ORAL at 11:38

## 2020-01-01 RX ADMIN — DILTIAZEM HYDROCHLORIDE 5 MG: 5 INJECTION INTRAVENOUS at 11:09

## 2020-01-01 RX ADMIN — POTASSIUM CHLORIDE 10 MEQ: 7.46 INJECTION, SOLUTION INTRAVENOUS at 21:54

## 2020-01-01 RX ADMIN — ENOXAPARIN SODIUM 40 MG: 40 INJECTION SUBCUTANEOUS at 23:15

## 2020-01-01 RX ADMIN — POTASSIUM CHLORIDE 10 MEQ: 7.46 INJECTION, SOLUTION INTRAVENOUS at 03:56

## 2020-01-01 RX ADMIN — METOPROLOL SUCCINATE 50 MG: 50 TABLET, FILM COATED, EXTENDED RELEASE ORAL at 15:52

## 2020-01-01 RX ADMIN — CALCIUM GLUCONATE 2 G: 94 INJECTION, SOLUTION INTRAVENOUS at 02:54

## 2020-01-01 RX ADMIN — SODIUM CHLORIDE 75 ML/HR: 900 INJECTION, SOLUTION INTRAVENOUS at 21:20

## 2020-01-01 RX ADMIN — BARIUM SULFATE 15 ML: 400 PASTE ORAL at 11:38

## 2020-01-01 RX ADMIN — DEXTROSE MONOHYDRATE AND SODIUM CHLORIDE 75 ML/HR: 5; .45 INJECTION, SOLUTION INTRAVENOUS at 18:29

## 2020-01-01 RX ADMIN — DEXTROSE MONOHYDRATE 100 ML/HR: 5 INJECTION, SOLUTION INTRAVENOUS at 04:16

## 2020-01-01 RX ADMIN — LEVOFLOXACIN 750 MG: 5 INJECTION, SOLUTION INTRAVENOUS at 09:48

## 2020-01-01 RX ADMIN — POTASSIUM CHLORIDE 10 MEQ: 7.46 INJECTION, SOLUTION INTRAVENOUS at 06:38

## 2020-01-01 RX ADMIN — ENOXAPARIN SODIUM 40 MG: 40 INJECTION SUBCUTANEOUS at 22:24

## 2020-01-01 RX ADMIN — CALCIUM GLUCONATE 2 G: 94 INJECTION, SOLUTION INTRAVENOUS at 08:15

## 2020-01-01 RX ADMIN — POTASSIUM CHLORIDE 10 MEQ: 7.46 INJECTION, SOLUTION INTRAVENOUS at 23:00

## 2020-01-01 RX ADMIN — DEXTROSE MONOHYDRATE 100 ML/HR: 5 INJECTION, SOLUTION INTRAVENOUS at 17:00

## 2020-01-01 RX ADMIN — MORPHINE SULFATE 10 MG: 20 SOLUTION ORAL at 15:36

## 2020-01-01 RX ADMIN — CEFTRIAXONE SODIUM 1 G: 1 INJECTION, POWDER, FOR SOLUTION INTRAMUSCULAR; INTRAVENOUS at 11:00

## 2020-01-01 RX ADMIN — LEVOFLOXACIN 750 MG: 5 INJECTION, SOLUTION INTRAVENOUS at 08:45

## 2020-01-01 RX ADMIN — SODIUM CHLORIDE 5 MG/HR: 900 INJECTION, SOLUTION INTRAVENOUS at 19:45

## 2020-01-01 RX ADMIN — SODIUM CHLORIDE 10 MG/HR: 900 INJECTION, SOLUTION INTRAVENOUS at 05:04

## 2020-01-01 RX ADMIN — POTASSIUM CHLORIDE 10 MEQ: 7.46 INJECTION, SOLUTION INTRAVENOUS at 05:30

## 2020-01-01 RX ADMIN — DEXTROSE MONOHYDRATE 12.5 MG/HR: 50 INJECTION, SOLUTION INTRAVENOUS at 09:05

## 2020-01-27 PROBLEM — E87.0 HYPERNATREMIA: Status: ACTIVE | Noted: 2020-01-01

## 2020-01-27 PROBLEM — N39.0 UTI (URINARY TRACT INFECTION): Status: ACTIVE | Noted: 2020-01-01

## 2020-01-27 PROBLEM — N30.00 ACUTE CYSTITIS: Status: ACTIVE | Noted: 2020-01-01

## 2020-01-27 PROBLEM — I48.91 ATRIAL FIBRILLATION WITH RVR (HCC): Status: ACTIVE | Noted: 2020-01-01

## 2020-01-27 PROBLEM — A41.9 SEPSIS (HCC): Status: ACTIVE | Noted: 2020-01-01

## 2020-01-27 NOTE — PROGRESS NOTES
Reason for Admission:   Sepsis Readmission score Unknown at this time Do you (patient/family) have any concerns for transition/discharge? no 
           
Plan for utilizing home health:   CM to follow and assess Current Advanced Directive/Advance Care Plan: On file Transition of Care Plan: Pt with altered mental status. Spoke with spouse, Justin Perrin and [address] [place] [address]. They report the following:  Pt has been declining in ADL's for approx one month. Prior to that he was ambulatory with a walker. For the past month, he has been in the bed or wheelchair. He was able to propel himself with his feet. Spouse states that the MD had increased his Seroquel to twice daily due to pt agitation, but that it had been recently decreased to once at night due to over sedation. She states that pt has not been at his baseline for \"a little while\". Nursing staff state that this am pt had been less alert and slumping in his chair. The pt sees Dr. Geo Caceres as his PCP. Last saw him on 12/19/19 and also has neuro appt coming up on 2/10. Pt has Medicare and [employer]. Wife states plan is for him to return to [address] [place] [address] when medically ready. Care Management Interventions PCP Verified by CM: Yes(Sees Dr Cyn Harrell) Last Visit to PCP: 12/19/19 Mode of Transport at Discharge: Self Transition of Care Consult (CM Consult): Discharge Planning Current Support Network: Assisted Living([facility]) Confirm Follow Up Transport: Family Discharge Location Discharge Placement: Assisted Living([facility])

## 2020-01-27 NOTE — PROGRESS NOTES
Spoke with primary RN regarding sepsis bundle. Slime Banks MSN, RN [phone] [facility] [phone]

## 2020-01-27 NOTE — PROGRESS NOTES
Problem: Discharge Planning Goal: *Discharge to safe environment Outcome: Progressing Towards Goal 
 Plan: [employer]

## 2020-01-27 NOTE — ED PROVIDER NOTES
Date: 1/27/2020 Patient Name: Vilma Fulton III History of Presenting Illness Chief Complaint Patient presents with  Altered mental status History Provided By: Patient HPI/Chief Complaint: (Context):who presents with patient with confusion and poor responsiveness Per medics patient has been doing that since Friday today the nurse who came notice he is looking more sluggish than usual and sentiment for evaluation Patient is poor with communication and is not clear what his baseline is although patient is responding to verbal stimuli and communicating although slowly History and physical limited due to patient's dementia and acute condition. --- Per EMS review patient is more sluggish and not as alert today. This was noticed on Friday and today when the crew arrived. Unclear if this is been going on since last week but the morning crew was alerted by his sluggishness. Patient is able to sit in the wheelchair and push the wheelchair usually this is per EMS Reviewing patient's [facility] note where patient is with a history of DNR, patient does have history of dementia as well with atrial fibrillation hypertension on this note for transfer from the nursing home Patient is on melatonin Nasacort PCP: Rada Boxer, MD 
 
Current Facility-Administered Medications Medication Dose Route Frequency Provider Last Rate Last Dose  sodium chloride (NS) flush 5-10 mL  5-10 mL IntraVENous PRN Eva Daly MD      
 metoprolol (LOPRESSOR) injection 5 mg  5 mg IntraVENous NOW Eva Daly MD      
 metoprolol succinate (TOPROL-XL) XL tablet 50 mg  50 mg Oral NOW Eva Daly MD      
 
Current Outpatient Medications Medication Sig Dispense Refill  midodrine (PROAMITINE) 2.5 mg tablet Take 2.5 mg by mouth three (3) times daily (with meals).  Comp. Stocking,Thigh,Long,Large misc 1 Package by Does Not Apply route daily. 1 Package 6  artificial tears,hypromellose, (SYSTANE GEL) 0.3 % gel ophthalmic ointment Administer 1 Strip to both eyes four (4) times daily. 1 strip  QUEtiapine (SEROQUEL) 25 mg tablet Take 25 mg by mouth daily.  ketotifen (ZADITOR) 0.025 % (0.035 %) ophthalmic solution Administer 1 Drop to both eyes two (2) times a day.  melatonin 5 mg cap capsule Take 10 mg by mouth nightly. dissolvable tabs  rivastigmine (EXELON) 13.3 mg/24 hour patch 1 Patch by TransDERmal route daily.  triamcinolone (NASACORT) 55 mcg nasal inhaler 1 [place] by Both Nostrils route as needed for Cough, Diarrhea, Fever, Nausea or Pain. Past History Past Medical History: 
Past Medical History:  
Diagnosis Date  Allergic rhinitis  Dementia ([facility]  Dyslipidemia   
 HTN (hypertension)  Ill-defined condition   
 hyperlipidemia  Paroxysmal atrial fibrillation (HCC)  Syncope Past Surgical History: 
History reviewed. No pertinent surgical history. Family History: 
Family History Problem Relation Age of Onset  Parkinson's Disease Mother Social History: 
Social History Tobacco Use  Smoking status: Former Smoker Last attempt to quit: 1963 Years since quittin.4  Smokeless tobacco: Never Used Substance Use Topics  Alcohol use: Yes  Drug use: No  
 
 
Allergies: 
No Known Allergies Review of Systems Review of Systems Unable to perform ROS: Dementia Physical Exam  
 
Physical Exam 
Constitutional:   
   General: He is not in acute distress. Appearance: He is well-developed. He is not ill-appearing, toxic-appearing or diaphoretic. HENT:  
   Head: Normocephalic and atraumatic. Nose: Nose normal.  
Eyes:  
   Conjunctiva/sclera: Conjunctivae normal.  
   Pupils: Pupils are equal, round, and reactive to light. Neck: Musculoskeletal: Normal range of motion and neck supple. Cardiovascular: Rate and Rhythm: Tachycardia present. Rhythm irregular. Pulmonary:  
   Effort: Pulmonary effort is normal.  
   Breath sounds: Normal breath sounds. Abdominal:  
   General: Bowel sounds are normal.  
   Palpations: Abdomen is soft. Musculoskeletal:  
   Comments: Patient who moves all of his extremities per very minimal strength Patient is responding to tactile stimuli to all the extremities as well I could not do finger-to-nose and I could not do gait as patient is generalized weak. Lymphadenopathy:  
   Cervical: No cervical adenopathy. Skin: 
   General: Skin is warm. Capillary Refill: Capillary refill takes less than 2 seconds. Neurological:  
   Mental Status: He is alert. Medical Decision Making I am the first provider for this patient. I reviewed the vital signs, available nursing notes, past medical history, past surgical history, family history and social history. Provider Notes (Medical Decision Making): Patient presents with tachycardia, atrial fibrillation, needs rate control Patient also presents with confusion Unclear if this metabolic or CNS Patient is having the symptoms since Friday per EMS and per the nursing home as well. I will get CT scan brain I will have patient with electrolytes urinalysis ammonia rule out any dysrhythmia I will rate control and look for any cause of patient's tachycardia I will also check for any reason for bacteremia or sepsis causing patient's symptoms Vital Signs-Reviewed the patient's vital signs. Pulse Oximetry Analysis -95%, room air, normal 
 
Cardiac Monitor: 
Rate/Rhythm: 160, atrial fibrillation EKG: Interpreted by the EP. Patient's time of the EKG is 842, 162 atrial fibrillation is appreciated. There is no acute sign of STEMI although there is poor R wave progression in the inferior anterior wall. --- Today's EKG is compared with #2019 patient's had 97 heart rate in atrial fibrillation on that EKG as well patient's EKG morphology poor R wave the inferior interval is appreciated there as well Patient's last visit from November 2019 is reviewed Patient with dementia Kaylene body, paroxysmal A. fib Patient with syncope that day with vomiting patient's history of hypertension hyperlipidemia paroxysmal A. fib Vitals:  
 01/27/20 1305 01/27/20 1315 01/27/20 1316 01/27/20 1330 BP:  (!) 158/123  112/65 Pulse: (!) 115 (!) 118 (!) 102 (!) 116 Resp: 21 27 20 Temp:      
SpO2: 95% 96%  98% Weight:      
 
 
Records Reviewed: Nursing Notes, Old Medical Records and Previous electrocardiograms ED Course:  
10:11 AM 
Patient is awake and alert. Patient's labs have been reviewed hyponatremia, UTI, tachycardia is still appreciated Have instructed nurse to start with the initial fluid and slow down after first liter as patient is significantly hyponatremic Patient is dehydrated with renal insufficiency although I am concerned about treating this complication. Patient will get antibiotic as well Patient's rate is been controlled I will consult hospitalist for admission at this point. 
 
--------------------- 
CRITICAL CARE NOTE : 
 
10:10 AM 
 
IMPENDING DETERIORATION -Cardiovascular and Metabolic ASSOCIATED RISK FACTORS - Hypotension, Dysrhythmia and Metabolic changes MANAGEMENT- Bedside Assessment INTERPRETATION -  Xrays, CT Scan and ECG INTERVENTIONS - hemodynamic mngmt and Metobolic interventions CASE REVIEW - Hospitalist 
 
TREATMENT RESPONSE -Stable PERFORMED BY - Self NOTES   : 
 
I have spent 30 minutes of critical care time involved in lab review, consultations with specialist, family decision- making, bedside attention and documentation. During this entire length of time I was immediately available to the patient . Judge Best MD 
 
Patient's echocardiogram from 2017 shows EF of 60 to 65%. 
==== For Hospitalized Patients: 1. Hospitalization Decision Time: The decision to hospitalize the patient was made by Dr. Vinnie Rios 2. Aspirin: Aspirin was not given because the patient did not present with a stroke at the time of their Emergency Department evaluation === 
I reviewed this patient's information with the wife at bedside she said patient had fever yesterday has been somnolent has been on Seroquel the dose has been reduced Patient will get influenza that he is at runny nose as well. I have discussed all the labs with her as well she understands and agrees with the plan patient remained stable in the emergency department Patient's heart rate is  and he can jump up to 140s but stays stablelower levels near 100. I will continue to monitor and observe this patient until patient goes up to the floor where hospitalist can manage the patient. Nurse Castle is aware of these findings and influenza test ordered. --- Patient with no acute hypoxia, no significant lung finding no coughing. Patient's influenza is negative Chest x-ray is questionable infiltrate at the base of the lung I will make sure the patient is appropriately treated for that as well. Unclear if this is atelectasis versus infiltrate Patient is not hypoxic patient is already gotten Rocephin Patient already has blood cultures drawn for concern for sepsis patient did have a UTI 
--------------- 
 
Diagnostic Study Results Labs - Recent Results (from the past 12 hour(s)) AMMONIA Collection Time: 01/27/20  9:00 AM  
Result Value Ref Range Ammonia <10 (L) 11 - 32 UMOL/L  
CBC WITH AUTOMATED DIFF Collection Time: 01/27/20  9:13 AM  
Result Value Ref Range WBC 18.0 (H) 4.6 - 13.2 K/uL  
 RBC 4.46 (L) 4.70 - 5.50 M/uL  
 HGB 14.4 13.0 - 16.0 g/dL HCT 47.1 36.0 - 48.0 % .6 (H) 74.0 - 97.0 FL  
 MCH 32.3 24.0 - 34.0 PG  
 MCHC 30.6 (L) 31.0 - 37.0 g/dL  
 RDW 14.2 11.6 - 14.5 % PLATELET 415 216 - 984 K/uL MPV 11.4 9.2 - 11.8 FL  
 NEUTROPHILS 87 (H) 40 - 73 % LYMPHOCYTES 8 (L) 21 - 52 % MONOCYTES 5 3 - 10 % EOSINOPHILS 0 0 - 5 % BASOPHILS 0 0 - 2 %  
 ABS. NEUTROPHILS 15.7 (H) 1.8 - 8.0 K/UL  
 ABS. LYMPHOCYTES 1.4 0.9 - 3.6 K/UL  
 ABS. MONOCYTES 0.9 0.05 - 1.2 K/UL  
 ABS. EOSINOPHILS 0.0 0.0 - 0.4 K/UL  
 ABS. BASOPHILS 0.0 0.0 - 0.1 K/UL  
 DF AUTOMATED METABOLIC PANEL, COMPREHENSIVE Collection Time: 01/27/20  9:13 AM  
Result Value Ref Range Sodium 167 (HH) 136 - 145 mmol/L Potassium 4.2 3.5 - 5.5 mmol/L Chloride 135 (H) 100 - 111 mmol/L  
 CO2 27 21 - 32 mmol/L Anion gap 5 3.0 - 18 mmol/L Glucose 135 (H) 74 - 99 mg/dL BUN 58 (H) 7.0 - 18 MG/DL Creatinine 1.87 (H) 0.6 - 1.3 MG/DL  
 BUN/Creatinine ratio 31 (H) 12 - 20 GFR est AA 42 (L) >60 ml/min/1.73m2 GFR est non-AA 35 (L) >60 ml/min/1.73m2 Calcium 8.7 8.5 - 10.1 MG/DL Bilirubin, total 0.6 0.2 - 1.0 MG/DL  
 ALT (SGPT) 16 16 - 61 U/L  
 AST (SGOT) 21 10 - 38 U/L Alk. phosphatase 75 45 - 117 U/L Protein, total 7.3 6.4 - 8.2 g/dL Albumin 2.7 (L) 3.4 - 5.0 g/dL Globulin 4.6 (H) 2.0 - 4.0 g/dL A-G Ratio 0.6 (L) 0.8 - 1.7 LIPASE Collection Time: 01/27/20  9:13 AM  
Result Value Ref Range Lipase 252 73 - 393 U/L  
NT-PRO BNP Collection Time: 01/27/20  9:13 AM  
Result Value Ref Range NT pro-BNP 3,615 (H) 0 - 1,800 PG/ML  
CARDIAC PANEL,(CK, CKMB & TROPONIN) Collection Time: 01/27/20  9:13 AM  
Result Value Ref Range  (H) 39 - 308 U/L  
 CK - MB 1.4 <3.6 ng/ml CK-MB Index 0.3 0.0 - 4.0 % Troponin-I, QT 0.03 0.0 - 0.045 NG/ML  
CULTURE, BLOOD Collection Time: 01/27/20  9:14 AM  
Result Value Ref Range Special Requests: PERIPHERAL Culture result: PENDING   
POC LACTIC ACID Collection Time: 01/27/20  9:40 AM  
Result Value Ref Range Lactic Acid (POC) 1.46 0.40 - 2.00 mmol/L  
URINALYSIS W/ RFLX MICROSCOPIC  Collection Time: 01/27/20  9:45 AM  
 Result Value Ref Range Color YELLOW Appearance CLOUDY Specific gravity 1.024 1.005 - 1.030    
 pH (UA) 5.0 5.0 - 8.0 Protein 100 (A) NEG mg/dL Glucose NEGATIVE  NEG mg/dL Ketone NEGATIVE  NEG mg/dL Bilirubin NEGATIVE  NEG Blood MODERATE (A) NEG Urobilinogen 0.2 0.2 - 1.0 EU/dL Nitrites POSITIVE (A) NEG Leukocyte Esterase TRACE (A) NEG URINE MICROSCOPIC ONLY Collection Time: 01/27/20  9:45 AM  
Result Value Ref Range WBC 21 to 35 0 - 4 /hpf  
 RBC 4 to 10 0 - 5 /hpf Bacteria 3+ (A) NEG /hpf INFLUENZA A & B AG (RAPID TEST) Collection Time: 01/27/20  1:20 PM  
Result Value Ref Range Influenza A Antigen NEGATIVE  NEG Influenza B Antigen NEGATIVE  NEG Radiologic Studies -  
CT HEAD WO CONT Final Result IMPRESSION:  
  
  
1. No acute intracranial abnormality. Stable exam.  
  
XR CHEST PORT Final Result Impression:  
  
Bibasilar subsegmental atelectasis versus possible left basilar developing  
infiltrate. CT Results  (Last 48 hours) 01/27/20 0925  CT HEAD WO CONT Final result Impression:  IMPRESSION:  
   
   
1. No acute intracranial abnormality. Stable exam.  
  
 Narrative:  EXAM: CT head INDICATION: 25-year-old patient with altered mental status COMPARISON: 11/19/2019 TECHNIQUE: Axial CT imaging of the head was performed without intravenous  
contrast. Standard multiplanar coronal and sagittal reformatted images were  
obtained and are included in interpretation. One or more dose reduction techniques were used on this CT: automated exposure  
control, adjustment of the mAs and/or kVp according to patient size, and  
iterative reconstruction techniques. The specific techniques used on this CT  
exam have been documented in the patient's electronic medical record. Digital  
Imaging and Communications in Medicine (DICOM) format image data are available to nonaffiliated external healthcare facilities or entities on a secure, media  
free, reciprocally searchable basis with patient authorization for at least a  
12-month period after this study. _______________ FINDINGS:  
   
BRAIN AND POSTERIOR FOSSA: The sulci, folia, ventricles and basal cisterns are  
within normal limits for the patient?s age. There is no intracranial hemorrhage,  
mass effect, or midline shift. Gray-white matter differentiation is within  
normal limits. EXTRA-AXIAL SPACES AND MENINGES: There is no acute extra-axial fluid collection  
present. CALVARIUM: Intact. SINUSES: Clear. OTHER: None.  
   
_______________ CXR Results  (Last 48 hours) 01/27/20 0912  XR CHEST PORT Final result Impression:  Impression:  
   
Bibasilar subsegmental atelectasis versus possible left basilar developing  
infiltrate. Narrative:  CHEST AP PORTABLE Indication: Meets systemic inflammatory response syndrome criteria. [place] Comparison: None. Findings: Minimal streaky opacities in the lung bases, left more than right. Upper lung zones are clear. The cardiac silhouette and pulmonary vascularity  
appear within normal limits. No evidence for pneumothorax or pleural effusion. Discharge Clinical Impression: 1. Hypernatremia 2. RAJWINDER (acute kidney injury) ([address] 3. Atrial fibrillation with rapid ventricular response ([address] 4. Dehydration 5. Generalized weakness 6. Confusion 7. Urinary tract infection without hematuria, site unspecified Disposition: 
Admit It should be noted that I will be the provider of record for this patient Honey Mathur MD 
 
 
Follow-up Information None Current Discharge Medication List

## 2020-01-28 NOTE — PROGRESS NOTES
Progress Note Patient: Marsha Schwartz III               Sex: male          DOA: 1/27/2020 YOB: 1940      Age:  78 y.o.        LOS:  LOS: 2 days CHIEF COMPLAINT:  Sepsis, a fib, hypernatremia Subjective:  
 
Patient is more alert today He is talking more HR is under better control, but not fully controlled Objective:  
  
Visit Vitals /64 Pulse 87 Temp 99.4 °F (37.4 °C) Resp 23 Ht 6' (1.829 m) Wt 75.3 kg (166 lb) SpO2 100% BMI 22.51 kg/m² Physical Exam: 
Gen:  Patient is more interactive. Closer to baseline mental status HEENT and Neck:  PERRL, No cervical tenderness or masses Lungs:  Clear bilaterally. No rales, no wheeze. Normal effort. Heart:  Tachycardic, irregular. No murmur or gallop. No JVD. No edema. Abdomen:  Soft, non tender, no masses, no Hepatosplenomegally Extremities:  No digital clubbing, no cyanosis Neuro:  Mild tremor present, no seizure Lab/Data Reviewed: 
BMP:  
Lab Results Component Value Date/Time  (HH) 01/29/2020 04:00 AM  
 K 4.2 01/29/2020 04:00 AM  
  (H) 01/29/2020 04:00 AM  
 CO2 23 01/29/2020 04:00 AM  
 AGAP 5 01/29/2020 04:00 AM  
  (H) 01/29/2020 04:00 AM  
 BUN 36 (H) 01/29/2020 04:00 AM  
 CREA 1.21 01/29/2020 04:00 AM  
 GFRAA >60 01/29/2020 04:00 AM  
 GFRNA 58 (L) 01/29/2020 04:00 AM  
 
CBC:  
Lab Results Component Value Date/Time WBC 16.1 (H) 01/29/2020 04:00 AM  
 HGB 11.5 (L) 01/29/2020 04:00 AM  
 HCT 38.2 01/29/2020 04:00 AM  
  01/29/2020 04:00 AM  
 
 
 
 
Assessment/Plan Principal Problem: 
  Sepsis ([address] (1/27/2020) Active Problems: 
  UTI (urinary tract infection) (1/27/2020) Metabolic encephalopathy (0/77/8315) Overview: In the setting of dementia PAF (paroxysmal atrial fibrillation) ([facility] (3/15/2017) Essential hypertension (3/15/2017) Dyslipidemia (3/15/2017) Dementia ([facility] (3/15/2017) Hypernatremia (1/27/2020) Atrial fibrillation with RVR ([address] (1/27/2020) Plan: 
Expanded antibiotics because of increased leukocytosis Follow cultures Will consult Cardiology regarding A fib 
BP control Follow Sodium closely, changed IVF to D5 1/2 NS. May need to consult Nephrology Discussed with wife at the bedside.

## 2020-01-28 NOTE — PROGRESS NOTES
attempted to complete  the initial Spiritual Assessment of the patient in bed 11 of the emergency room, and offer Pastoral Care support but found patient in a condition were he could not move or communicate for some reason.,  No family seen at this visit,. Patient does not have any Cheondoism/cultural needs that will affect patients preferences in health care. Chaplains will continue to follow and will provide pastoral care on an as needed/requested basis. Kaitlyn Laura Board Certified [facility] [phone]

## 2020-01-28 NOTE — ED NOTES
Spoke with Dr. Oswaldo Davies regarding diet. States he does not want patient to eat right due to concern for ability to swallow.

## 2020-01-28 NOTE — CONSULTS
[facility] - Consult Note Date of  Admission: 1/27/2020  8:42 AM  
Primary Care Physician:  Margarita Miranda MD 
Patient seen and examined independently. Patient with known chronic atrial fibrillation admitted with fever tolerated ventricular response. Rate well controlled on IV Cardizem at present. Cognitive impairment. Last situation with patient's wife. After swallowing assessed, make plans for long-term rate control. Agree with assessment and plan below. Janusz Oscar MD 
 Assessment:  
 
Patient Active Problem List  
Diagnosis Code  PAF (paroxysmal atrial fibrillation) ([facility]) I48.0  Syncope R55  Essential hypertension I10  Dyslipidemia E78.5  Dementia ([address] F03.90  Hypernatremia E87.0  Sepsis ([address] A41.9  Acute cystitis N30.00  
 UTI (urinary tract infection) N39.0  Atrial fibrillation with RVR ([facility]) I48.91  
 
- Urosepsis - Atrial fibrillation with RVR, known chronic afib. Not on anticoagulation due to falls and syncope hx 
- Hyperlipidemia 
- History of falls and syncope - Chronic dysphagia, wife reports he cannot swallow pills - Dementia Plan: - Agree with Cardizem gtt - Patient is unable to take PO meds which is a chronic issue per his wife at bedside, and on review of last cardiology note. - Not on anticoagulation secondary to fall and syncope history - Echo this admission - Would address code status, documentation on triage note stated that EMS stated he was DNR, currently full code on EMR History of Present Illness: This is a 78 y.o. male admitted for Sepsis ([address] [A41.9] Sepsis ([address] [A41.9] Atrial fibrillation with RVR ([address] [I48.91]. Patient complains of:  Seen in consult for afib RVR. Patient has a history of dementia and cannot provide history. This is an 78year old male with a history of afib, falls and dementia that cardiology is seeing in consult due to afib RVR.  He presented due to fever and altered mental status per his wife. Urinalysis and now urine culture confirming UTI (gram negative rods). He is in afib RVR and has been started on Cardizem drip. He has been started on antibiotic regimen and is currently admitted to the ICU. Cardiac risk factors: male gender, hypertension ROS not obtained due to patient factors Past Medical History:  
 
Past Medical History:  
Diagnosis Date  Allergic rhinitis  Dementia ([address]  Dyslipidemia   
 HTN (hypertension)  Ill-defined condition   
 hyperlipidemia  Paroxysmal atrial fibrillation (HCC)  Syncope Social History:  
 
Social History Socioeconomic History  Marital status:  Spouse name: Not on file  Number of children: Not on file  Years of education: Not on file  Highest education level: Not on file Tobacco Use  Smoking status: Former Smoker Last attempt to quit: 1963 Years since quittin.4  Smokeless tobacco: Never Used Substance and Sexual Activity  Alcohol use: Yes  Drug use: No  
 
 
 Family History:  
 
Family History Problem Relation Age of Onset  Parkinson's Disease Mother Medications:  
No Known Allergies Current Facility-Administered Medications Medication Dose Route Frequency  QUEtiapine (SEROquel) tablet 25 mg  25 mg Oral DAILY  dextrose 5 % - 0.45% NaCl infusion  75 mL/hr IntraVENous CONTINUOUS  
 cefTRIAXone (ROCEPHIN) 1 g in 0.9% sodium chloride (MBP/ADV) 50 mL MBP  1 g IntraVENous Q24H  
 [START ON 2020] levoFLOXacin (LEVAQUIN) 750 mg in D5W IVPB  750 mg IntraVENous Q48H  VANCOMYCIN INFORMATION NOTE   Other Rx Dosing/Monitoring  [START ON 2020] vancomycin (VANCOCIN) 1,000 mg in 0.9% sodium chloride (MBP/ADV) 250 mL adv  1,000 mg IntraVENous Q18H  
 [START ON 2020] Vancomycin trough to be drawn at 1330 before 1400 dose on    Other ONCE  
  influenza vaccine 2019-20 (6 mos+)(PF) (FLUARIX/FLULAVAL/FLUZONE QUAD) injection 0.5 mL  0.5 mL IntraMUSCular PRIOR TO DISCHARGE  sodium chloride (NS) flush 5-10 mL  5-10 mL IntraVENous PRN  
 dilTIAZem (CARDIZEM) 100 mg in 0.9% sodium chloride (MBP/ADV) 100 mL infusion  0-15 mg/hr IntraVENous TITRATE Physical Exam:  
 
Visit Vitals /67 Pulse (!) 114 Temp 98.4 °F (36.9 °C) Resp 23 Ht (P) 6' (1.829 m) Wt (P) 166 lb (75.3 kg) SpO2 97% BMI (P) 22.51 kg/m² BP Readings from Last 3 Encounters:  
01/28/20 127/67  
01/23/20 120/64  
01/23/20 120/64 Pulse Readings from Last 3 Encounters:  
01/28/20 (!) 114  
01/23/20 80  
01/23/20 84 Wt Readings from Last 3 Encounters:  
01/28/20 (P) 166 lb (75.3 kg)  
11/26/19 185 lb (83.9 kg)  
11/19/19 196 lb (88.9 kg) General:  cooperative, no distress Neck:  nontender, no JVD Lungs:  clear to auscultation bilaterally Heart:  irregularly irregular rhythm Abdomen:  abdomen is soft without significant tenderness, masses, organomegaly or guarding Extremities:  extremities normal, atraumatic, no cyanosis or edema Skin: Warm and dry. no hyperpigmentation, vitiligo, or suspicious lesions Neuro: alert, oriented x3, affect appropriate Psych: non focal 
 
 Data Review:  
 
Recent Labs  
  01/28/20 
0443 01/27/20 
0913 WBC 20.6* 18.0* HGB 12.6* 14.4 HCT 42.1 47.1  264 Recent Labs  
  01/28/20 
7897 01/28/20 
0443 01/27/20 
0913 * 168* 167* K 3.8 4.0 4.2 * 138* 135* CO2 22 24 27 * 101* 135* BUN 43* 45* 58* CREA 1.21 1.36* 1.87* CA 8.0* 8.0* 8.7 ALB  --   --  2.7* SGOT  --   --  21 ALT  --   --  16 Results for orders placed or performed during the hospital encounter of 01/27/20 EKG, 12 LEAD, INITIAL Result Value Ref Range Ventricular Rate 162 BPM  
 QRS Duration 90 ms Q-T Interval 280 ms QTC Calculation (Bezet) 459 ms Calculated R Axis -39 degrees Calculated T Axis 79 degrees Diagnosis Atrial fibrillation with rapid ventricular response Left axis deviation Anteroseptal infarct (cited on or before 14-APR-2018) Abnormal ECG When compared with ECG of 26-NOV-2019 17:25, 
Vent. rate has increased BY  65 BPM 
Nonspecific T wave abnormality now evident in Anterior leads Confirmed by Karlos Orellana ([id number]) on 1/27/2020 6:21:27 PM 
  
 
 
All Cardiac Markers in the last 24 hours:   
Lab Results Component Value Date/Time  (H) 01/28/2020 08:32 AM  
  (H) 01/28/2020 04:43 AM  
  (H) 01/27/2020 09:35 PM  
 CKMB <1.0 01/28/2020 08:32 AM  
 CKMB 1.1 01/28/2020 04:43 AM  
 CKMB 1.3 01/27/2020 09:35 PM  
 CKND1  01/28/2020 08:32 AM  
  CALCULATION NOT PERFORMED WHEN RESULT IS BELOW LINEAR LIMIT  
 CKND1 0.3 01/28/2020 04:43 AM  
 CKND1 0.3 01/27/2020 09:35 PM  
 TROIQ 0.02 01/28/2020 08:32 AM  
 TROIQ <0.02 01/28/2020 04:43 AM  
 TROIQ 0.02 01/27/2020 09:35 PM  
 
 
Last Lipid:  No results found for: CHOL, CHOLX, CHLST, CHOLV, HDL, HDLP, LDL, LDLC, DLDLP, TGLX, TRIGL, TRIGP, CHHD, CHHDX Signed By: Charlestine Collar. Claudette Arbour, PA-C January 28, 2020

## 2020-01-28 NOTE — ED NOTES
Patient incontinent of urine. Patient linin changed, jace care given. Patient placed in position of comfort. Patient tolerated well. No foul odor from urine noted

## 2020-01-28 NOTE — H&P
History and Physical 
 
Patient: Sheree Ballard III               Sex: male          DOA: 1/27/2020 YOB: 1940      Age:  78 y.o.        LOS:  LOS: 1 day HPI:  
 
Sheree Ballard III is a 78 y.o. male who was sent to the ER from [facility] for evaluation of decreased mental status. His wife reports that the had fever and that he had decreased activity level. He was more combative than he typically was. He did not have cough. He did not have nausea or vomiting. No diarrhea. He presented to the ER where he was found to have altered mental status. He also has UTI. In the ER he was also found to have atrial fibrillation with Rapid ventricular response. His BP was preserved. He will be admitted for ongoing management. Past Medical History:  
Diagnosis Date  Allergic rhinitis  Dementia ([address]  Dyslipidemia   
 HTN (hypertension)  Ill-defined condition   
 hyperlipidemia  Paroxysmal atrial fibrillation (HCC)  Syncope Social History: 
 Tobacco use:  Patient quit smoking 50 years ago Alcohol use:  Patient does not use alcohol Patient is from [place] place Family History: Mother had Alzheimer's dementia Father had Parkinson's Review of Systems Constitutional:  Subjective fever, no weight loss HEENT:  No headache or visual changes Cardiovascular:  No chest pain or diaphoresis Respiratory:  No coughing, wheezing, or shortness of breath. GI:  No nausea or vomitting. No diarrhea :  No hematuria or dysuria Skin:  No rashes or moles Neuro:  Dementia as above, no seizure or syncope Hematological:  No bruising or bleeding Endocrine:  No diabetes or thyroid disease Physical Exam:  
  
Visit Vitals /77 Pulse (!) 122 Temp 99.7 °F (37.6 °C) Resp 22 Wt 75.3 kg (166 lb) SpO2 93% BMI 23.82 kg/m² Physical Exam: 
 
Gen:  No distress, alert HEENT:  Normal cephalic atraumatic, extra-occular movements are intact. Neck:  Supple, No JVD Lungs:  Clear bilaterally, no wheeze, no rales, normal effort Heart:  Regular Rate and Rhythm, normal S1 and S2, no edema Abdomen:  Soft, non tender, normal bowel sounds, no guarding. Extremities:  Well perfused, no cyanosis or edema Neurological:  Awake and alert, CN's are intact, normal strength throughout extremities Skin:  No rashes or moles Mental Status:  Normal thought process, does not appear anxious Laboratory Studies: 
 
BMP:  
Lab Results Component Value Date/Time  (HH) 01/28/2020 04:43 AM  
 K 4.0 01/28/2020 04:43 AM  
  (H) 01/28/2020 04:43 AM  
 CO2 24 01/28/2020 04:43 AM  
 AGAP 6 01/28/2020 04:43 AM  
  (H) 01/28/2020 04:43 AM  
 BUN 45 (H) 01/28/2020 04:43 AM  
 CREA 1.36 (H) 01/28/2020 04:43 AM  
 GFRAA >60 01/28/2020 04:43 AM  
 GFRNA 50 (L) 01/28/2020 04:43 AM  
 
CBC:  
Lab Results Component Value Date/Time WBC 20.6 (H) 01/28/2020 04:43 AM  
 HGB 12.6 (L) 01/28/2020 04:43 AM  
 HCT 42.1 01/28/2020 04:43 AM  
  01/28/2020 04:43 AM  
 
 
Assessment/Plan Principal Problem: 
  Sepsis ([address] (1/27/2020) Active Problems: 
  UTI (urinary tract infection) (1/27/2020) PAF (paroxysmal atrial fibrillation) ([address] (3/15/2017) Essential hypertension (3/15/2017) Dyslipidemia (3/15/2017) Dementia ([address] (3/15/2017) Hypernatremia (1/27/2020) Atrial fibrillation with RVR ([address] (1/27/2020) PLAN: 
 
IV antibiotics Cardizem for rate control BP control Follow cultures IVF, follow renal function and sodium Follow mental status Follow Leukocytosis BP control Discussed with patient's wife at the bedside and got history

## 2020-01-28 NOTE — ED NOTES
TRANSFER - ED to INPATIENT REPORT: 
 
Verbal report given to Geetha Vik (name) on Twila Iglesias III  being transferred to [facility](unit) for routine progression of care Report consisted of patients Situation, Background, Assessment and  
Recommendations(SBAR). SBAR report made available to receiving floor on this patient being transferred to [facility] ICU [phone])  for routine progression of care Admitting diagnosis Sepsis ([facility] [A41.9] Sepsis ([facility] [A41.9] Atrial fibrillation with RVR ([facility] [I48.91] Information from the following report(s) SBAR was made available to receiving floor. Lines:  
Peripheral IV 01/27/20 Left Antecubital (Active) Site Assessment Clean, dry, & intact 1/28/2020  2:00 AM  
Phlebitis Assessment 0 1/28/2020  2:00 AM  
Infiltration Assessment 0 1/28/2020  2:00 AM  
Dressing Status Clean, dry, & intact 1/28/2020  2:00 AM  
   
Peripheral IV 01/28/20 Right Hand (Active) Site Assessment Clean, dry, & intact 1/28/2020  9:01 AM  
Phlebitis Assessment 0 1/28/2020  9:01 AM  
Infiltration Assessment 0 1/28/2020  9:01 AM  
Dressing Status Clean, dry, & intact 1/28/2020  9:01 AM  
Dressing Type Transparent 1/28/2020  9:01 AM  
  
 
 
 
Opportunity for questions and clarification was provided. Patient is disoriented Patient is  incontinent and non-ambulatory Valuables transported with patient Patient transported with: 
 Monitor MAP (Monitor): 96 =Monitored (most recent) Vitals w/ MEWS Score (last day) Date/Time MEWS Score Pulse Resp Temp BP Level of Consciousness SpO2  
 01/28/20 1041    (!) 121  29    126/85    98 % 01/28/20 0916              95 % 01/28/20 0915          119/80    96 % 01/28/20 0914        98.4 °F (36.9 °C)        
 01/28/20 0900    (!) 119  28    135/52    94 % 01/28/20 0832    (!) 117  27        93 % 01/28/20 0830    (!) 125  27    (!) 173/141    94 % 01/28/20 0815    (!) 123  25    137/87    95 % 01/28/20 0800    (!) 127  28    129/72    97 % 01/28/20 0730    (!) 122  28    (!) 131/101    92 % 01/28/20 0530    (!) 122  22    128/77    93 % 01/28/20 0515    (!) 120  25    136/57    91 % 01/28/20 0500    (!) 121  26    112/83    93 % 01/28/20 0345    (!) 117  20    167/80    96 % 01/28/20 0330    (!) 115  19    161/86      
 01/28/20 0315    (!) 116  24    (!) 133/95    (!) 88 % 01/28/20 0300    (!) 110  23    113/75    (!) 71 %  
 01/28/20 0245    (!) 115  24    132/87    92 % 01/28/20 0230    (!) 119  20    113/79    94 % 01/28/20 0145    (!) 116  25    123/72    (!) 87 % 01/28/20 0130    (!) 126  26    117/67    94 % 01/28/20 0115    (!) 127  25    114/62    90 % 01/28/20 0045    (!) 132  21    (!) 178/129    95 % 01/27/20 1930  6  (!) 148  20  99.7 °F (37.6 °C)  142/70  (!) Confused or Agitated  98 % 01/27/20 1915    (!) 155  24    134/67    95 % 01/27/20 1900    (!) 154  22    100/66    95 % 01/27/20 1830    (!) 155  27    119/74    94 % 01/27/20 1800    (!) 147  28    (!) 218/185    93 % 01/27/20 1745    (!) 149  30    (!) 201/175    94 % 01/27/20 1730    (!) 147  30    (!) 212/173    94 % 01/27/20 1715    (!) 153  26    (!) 194/131    94 % 01/27/20 1645    (!) 141  28    98/84    94 % 01/27/20 1630    (!) 133  26    (!) 88/70    95 % 01/27/20 1600    (!) 123  23    105/76    95 % 01/27/20 1530    (!) 145  28    99/74    94 % 01/27/20 1500    (!) 148  25    121/86    94 % 01/27/20 1330    (!) 116  20    112/65    98 % 01/27/20 1316    (!) 102            
 01/27/20 1315    (!) 118  27    (!) 158/123    96 % 01/27/20 1305    (!) 115  21        95 % 01/27/20 1300    (!) 115  21    127/69    98 % 01/27/20 1255    (!) 113  23        94 % 01/27/20 1254    (!) 117  23        97 % 01/27/20 1250    (!) 148  20        96 % 01/27/20 1245    (!) 145  21    125/79    96 % 01/27/20 1240    (!) 136  23        95 % 01/27/20 1235    (!) 139  23        95 % 01/27/20 1230    (!) 141  23    (!) 123/106    97 % 01/27/20 1225    (!) 148  28        95 % 01/27/20 1220    (!) 149  26        96 % 01/27/20 1215    (!) 142  29    115/73    96 % 01/27/20 1210    (!) 148  22        94 % 01/27/20 1205    (!) 141  20        97 % 01/27/20 1200    (!) 144  24    102/63    95 % 01/27/20 1155    (!) 146  22        96 % 01/27/20 1150    (!) 133  20        96 % 01/27/20 1145    (!) 134  19    141/71    97 % 01/27/20 1140    (!) 134  22        99 % 01/27/20 1135    (!) 133  19        99 % 01/27/20 1130    (!) 128  21    120/64    97 % 01/27/20 1125    (!) 121  21        97 % 01/27/20 1120    (!) 129  16        98 % 01/27/20 1115    (!) 124  18    129/60    98 % 01/27/20 1110    (!) 130  19    138/77    98 % 01/27/20 1100    (!) 155  25    (!) 168/134    95 % 01/27/20 1045    (!) 160  18    141/79    97 % 01/27/20 1030    (!) 154  20    (!) 240/184    97 % 01/27/20 1015    (!) 156  20        97 % 01/27/20 1000    (!) 160  24    (!) 144/94    90 % 01/27/20 0945    (!) 156  19    127/71      
 01/27/20 0930    (!) 154  26    124/83      
 01/27/20 0846    (!) 163  (!) 31        95 % 01/27/20 0845  7  (!) 163  24  98.3 °F (36.8 °C)  129/88  (!) Confused or Agitated   Septic Patients:  
 
Lactic Acid Lab Results Component Value Date LACPO 1.46 01/27/2020  
 (Most recent on top) Repeat drawn: NO Time: na ALL LACTIC ACIDS GREATER THAN 2 MUST BE REPEATED POC WITHIN 4 HOURS OR PRIOR TO ADMISSION Total Fluid Bolus initiated and documented on MAR: YES 
 
 All ordered antibiotics initiated within first 3 hours of TIME ZERO?   YES

## 2020-01-28 NOTE — CDMP QUERY
The diagnosis of AMS/confusion has been documented for this patient who has a history of dementia. If possible, please document in the progress notes and d/c summary if you are evaluating and / or treating any of the following: ? Metabolic Encephalopathy in the setting of dementia, currently resolving ? Hypertensive Encephalopathy in the setting of dementia, currently resolving ? Septic Encephalopathy in the setting of dementia, currently resolving ? Toxic Encephalopathy  in the setting of dementia, currently resolving 
? Other Encephalopathy 
? Other, please specify ? Clinically unable to determine The medical record reflects the following: 
   Risk Factors: PMH dementia Clinical Indicators: Sodium level on admission 167,   atrial fib with RVR, UTI per urinalysis, bun 58, creatinine 1.87 Per H&P He presented to the ER where he was found to have altered mental status. He also has UTI. In the ER he was also found to have atrial fibrillation with Rapid ventricular response 1/28 PN Patient is more alert today, He is talking more Treatment: IV antibiotics, IV fluids, cardizem IV, serial EKG's, telemetry  Monitoring Thank you, 
Gilda Saleh RN [phone]

## 2020-01-29 PROBLEM — G93.41 METABOLIC ENCEPHALOPATHY: Status: ACTIVE | Noted: 2020-01-01

## 2020-01-29 NOTE — PROGRESS NOTES
Problem: Mobility Impaired (Adult and Pediatric) Goal: *Acute Goals and Plan of Care (Insert Text) Description Physical Therapy Goals Initiated 1/29/2020 and to be accomplished within 7 day(s) 1. Patient will roll side to side in bed with minimal assistance/contact guard assist.   
2.  Patient will maintain sitting balance at EOB for 10 minutes minimal assistance/contact guard assist.  
3.  Patient will transfer from bed to chair and chair to bed with minimal assistance/contact guard assist using the least restrictive device. 4.  Patient will perform sit to stand with minimal assistance/contact guard assist. 
   
1/29/2020 1433 by Logan Wilson Outcome: Progressing Towards Goal 
PHYSICAL THERAPY EVALUATION Patient: Moishe Curling III (36 y.o. male) Date: 1/29/2020 Primary Diagnosis: Sepsis ([facility] [address] [A41.9] Sepsis ([facility] [address] [A41.9] Atrial fibrillation with RVR ([facility] [address] [I48.91] Precautions: Fall PLOF: Pt was not ambulatory since Thanksgiving 2019. Lives in memory care. Staff completes ADLs. ASSESSMENT : 
Based on the objective data described below, the patient presents with decreased tolerance to activity. Session was co-treated with OT. Pt required total assist to transfer from supine to sitting and scooting to EOB. Required constant support during sitting balance, but was able to maintain static balance for 10 seconds x2. Pt was unable to follow commands for testing LE strength and ROM. Total assist x2 for sit to supine. Total assist for rolling to sidelying for clean up. Pt remained supine in bed with HOB elevated to greater than 30 degrees. Education provided on bed mobility, transfers, ADLs, balance, amb, safety, exercise, role of PT, plan of care, cognition, skin integrity, vitals as indicated. Educated on need for RN assistance with mobility; verbalized understanding. Call bell in reach. Patient will benefit from skilled intervention to address the above impairments. Patient's rehabilitation potential is considered to be Fair Factors which may influence rehabilitation potential include:  
[]         None noted 
[x]         Mental ability/status [x]         Medical condition 
[]         Home/family situation and support systems 
[x]         Safety awareness 
[]         Pain tolerance/management 
[]         Other: PLAN : 
Recommendations and Planned Interventions:  
[x]           Bed Mobility Training             [x]    Neuromuscular Re-Education 
[x]           Transfer Training                   []    Orthotic/Prosthetic Training 
[]           Gait Training                          []    Modalities [x]           Therapeutic Exercises           []    Edema Management/Control 
[x]           Therapeutic Activities            [x]    Family Training/Education 
[x]           Patient Education 
[]           Other (comment): Frequency/Duration: Patient will be followed by physical therapy 3-5 times a week to address goals. Discharge Recommendations: Miguel Contreras Further Equipment Recommendations for Discharge: wheelchair SUBJECTIVE:  
Patient stated Chanell beth for working with me.  OBJECTIVE DATA SUMMARY:  
 
Past Medical History:  
Diagnosis Date Allergic rhinitis Dementia ([facility] [address] Dyslipidemia HTN (hypertension) Ill-defined condition   
 hyperlipidemia Paroxysmal atrial fibrillation (HCC) Syncope History reviewed. No pertinent surgical history. Barriers to Learning/Limitations: yes;  cognitive and altered mental status (i.e.Sedation, Confusion) Compensate with: Visual Cues, Verbal Cues, Tactile Cues and Kinesthetic Cues Home Situation: 
Home Situation Home Environment: Assisted living Care Facility Name: [place] One/Two Story Residence: One story Living Alone: No 
Support Systems: Skilled nursing facility Patient Expects to be Discharged to[de-identified] Skilled nursing facility Current DME Used/Available at Home: None Critical Behavior: 
Neurologic State: Alert;Confused Orientation Level: Oriented to person Cognition: Decreased command following;Memory loss Psychosocial 
Patient Behaviors: Calm; Cooperative Family  Behaviors: Calm; Cooperative Strength:   
Manual Muscle Testing (LE) Pt was unable to follow commands to test strength 
_________________________________________________ Range Of Motion: 
RLE and LLE Pt was unable to follow commands to test ROM Functional Mobility: 
Bed Mobility: 
Rolling: Total assistance Supine to Sit: Total assistance;Assist x2 Sit to Supine: Total assistance;Assist x2 Scooting: Total assistance Balance:  
Sitting: Impaired Sitting - Static: Poor (constant support) Neuro Re-education: 
Sitting balance at EOB for 10 minutes Pain: 
Pain level pre-treatment: 0/10 Pain level post-treatment: 0/10 Activity Tolerance:  
Fair After treatment:  
[]         Patient left in no apparent distress sitting up in chair 
[x]         Patient left in no apparent distress in bed 
[x]         Call bell left within reach [x]         Nursing notified 
[]         Caregiver present 
[]         Bed alarm activated 
[]         SCDs applied COMMUNICATION/EDUCATION:  
[x]         Role of physical therapy and plan of care in the acute care setting. [x]         Fall prevention education was provided and the patient/caregiver indicated understanding. [x]         Patient/family have participated as able in goal setting and plan of care. []         Patient/family agree to work toward stated goals and plan of care. []         Patient understands intent and goals of therapy, but is neutral about his/her participation. []         Patient is unable to participate in goal setting/plan of care: ongoing with therapy staff. Thank you for this referral. 
Velvet Rabago, SPT Time Calculation: 24 mins Eval Complexity: History: MEDIUM  Complexity : 1-2 comorbidities / personal factors will impact the outcome/ POC Exam:MEDIUM Complexity : 3 Standardized tests and measures addressing body structure, function, activity limitation and / or participation in recreation  Presentation: MEDIUM Complexity : Evolving with changing characteristics  Clinical Decision Making:Medium Complexity    Clinical judgement; ROM, MMT, functional mobility Overall Complexity:MEDIUM

## 2020-01-29 NOTE — PROGRESS NOTES
Internal Medicine Progress Note Patient's Name: Nunu Lambert III Admit Date: 1/27/2020 Length of Stay: 2 Assessment/Plan Principal Problem: 
  Sepsis ([facility] [address] (1/27/2020) Active Problems: 
  Atrial fibrillation with RVR ([facility] [address] (1/27/2020) Hypernatremia (1/27/2020) Essential hypertension (3/15/2017) Dyslipidemia (3/15/2017) Dementia ([facility] [address] (3/15/2017) UTI (urinary tract infection) (1/27/2020) Metabolic encephalopathy (1/65/1207) Overview: In the setting of dementia Sepsis 2/2 UTI 
- cont IV abx 
- 1/2 BCx GPR 
- repeat BCx today 
- UCx with pan-sus Ecoli 
- monitor CBC Afib RVR 
- appreciate cardiology 
- cont cardizem gtt until swallow function is evaluated Hypernatremia 
- minimize normal saline 
- cont D5, increase rate 
- monitor BMP q6 
- avoid decreasing >8meq in 24 hours Dysphagia - speech consult pending 
 
 
- Cont acceptable home medications for chronic conditions  
- DVT protocol I have personally reviewed all pertinent labs and films that have officially resulted over the last 24 hours. I have personally checked for all pending labs that are awaiting final results. HPI Per H&P, \"Cyrus Mcleod III is a 78 y.o. male who was sent to the ER from [facility] for evaluation of decreased mental status. His wife reports that the had fever and that he had decreased activity level. He was more combative than he typically was. He did not have cough. He did not have nausea or vomiting. No diarrhea. He presented to the ER where he was found to have altered mental status. He also has UTI. In the ER he was also found to have atrial fibrillation with Rapid ventricular response. His BP was preserved. He will be admitted for ongoing management. \" Hospital Course Afib initially managed with cardizem gtt. Speech therapy consulted to evaluate dysphagia. Hypernatremia managed with IV fluids. Subjective Pt s/e @ bedside. No major events overnight. Pt still altered, but occasionally saying wife's name. Wife at bedside. Objective Visit Vitals /72 Pulse 94 Temp 98.9 °F (37.2 °C) Resp 23 Ht 6' (1.829 m) Wt 75.3 kg (166 lb) SpO2 100% BMI 22.51 kg/m² Physical Exam: 
General Appearance: NAD, confused HENT: normocephalic/atraumatic, moist mucus membranes Neck: No JVD, supple Lungs: CTA with normal respiratory effort CV: IRIR, no m/r/g Abdomen: soft, non-tender, normal bowel sounds Extremities: no cyanosis, no peripheral edema Neuro: No focal deficits, motor/sensory intact Skin: Normal color, intact Intake and Output: 
Current Shift:  01/29 0701 - 01/29 1900 In: 96.5 [I.V.:96.5] Out: - Last three shifts:  01/27 1901 - 01/29 0700 In: 3949.8 [I.V.:3949.8] Out: 1600 [Urine:1600] Lab/Data Reviewed: 
BMP:  
Lab Results Component Value Date/Time  (HH) 01/29/2020 11:58 AM  
 K 4.6 01/29/2020 11:58 AM  
  (H) 01/29/2020 11:58 AM  
 CO2 25 01/29/2020 11:58 AM  
 AGAP 6 01/29/2020 11:58 AM  
  (H) 01/29/2020 11:58 AM  
 BUN 35 (H) 01/29/2020 11:58 AM  
 CREA 1.21 01/29/2020 11:58 AM  
 GFRAA >60 01/29/2020 11:58 AM  
 GFRNA 58 (L) 01/29/2020 11:58 AM  
 
CBC:  
Lab Results Component Value Date/Time WBC 16.1 (H) 01/29/2020 04:00 AM  
 HGB 11.5 (L) 01/29/2020 04:00 AM  
 HCT 38.2 01/29/2020 04:00 AM  
  01/29/2020 04:00 AM  
 
 
Imaging Reviewed: 
No results found. Medications Reviewed: 
Current Facility-Administered Medications Medication Dose Route Frequency  dilTIAZem (CARDIZEM) 100 mg in dextrose 5% (MBP/ADV) 100 mL infusion  0-15 mg/hr IntraVENous TITRATE  vancomycin (VANCOCIN) 1,000 mg in dextrose 5% 250 mL - Pharmacy compound  1,000 mg IntraVENous Q18H  
 cefTRIAXone (ROCEPHIN) 1 g in dextrose 5% (50mL- Pharmacy Compound  1 g IntraVENous Q24H  
 QUEtiapine (SEROquel) tablet 25 mg  25 mg Oral DAILY  [START ON 1/30/2020] levoFLOXacin (LEVAQUIN) 750 mg in D5W IVPB  750 mg IntraVENous Q48H  VANCOMYCIN INFORMATION NOTE   Other Rx Dosing/Monitoring  [START ON 1/30/2020] Vancomycin trough to be drawn at 1330 before 1400 dose on 1/30   Other ONCE  
 influenza vaccine 2019-20 (6 mos+)(PF) (FLUARIX/FLULAVAL/FLUZONE QUAD) injection 0.5 mL  0.5 mL IntraMUSCular PRIOR TO DISCHARGE  dextrose 5% infusion  75 mL/hr IntraVENous CONTINUOUS  
 enoxaparin (LOVENOX) injection 40 mg  40 mg SubCUTAneous Q24H  
 ELECTROLYTE REPLACEMENT PROTOCOL - Potassium Standard Dosing   1 Each Other PRN  
 ELECTROLYTE REPLACEMENT PROTOCOL - Magnesium   1 Each Other PRN  
 ELECTROLYTE REPLACEMENT PROTOCOL - Phosphorus  Standard Dosing  1 Each Other PRN  
 ELECTROLYTE REPLACEMENT PROTOCOL - Calcium   1 Each Other PRN  
 sodium chloride (NS) flush 5-10 mL  5-10 mL IntraVENous PRN Rosa Maria Maynard PA-C [address] [place], [place] [address] Office:  [phone] Pager: [phone]

## 2020-01-29 NOTE — PROGRESS NOTES
[facility] - Progress Note Admit Date: 1/27/2020 I saw, evaluated, interviewed and examined the patient personally. I agree with the findings and plan of care as documented below with PA-C note Urosepsis and a.fib Continue IV CCB and transition to oral meds when able. Lyly Lopez MD  
 
 
Assessment: - Urosepsis - Atrial fibrillation with RVR, known chronic afib. Not on anticoagulation due to falls and syncope hx 
- Hyperlipidemia 
- History of falls and syncope - Chronic dysphagia, wife reports he cannot swallow pills - Dementia Plan: - Afib rates controlled, mostly 90's on telemetry - He is continued on Cardizem drip, swallow evaluation planned - Not on anticoagulation due to fall risk and frailty Subjective: No new complaints. Objective:  
  
Patient Vitals for the past 8 hrs: 
 Temp Pulse Resp BP SpO2  
01/29/20 1300  93 26 120/72 96 % 01/29/20 1200 (!) 100.5 °F (38.1 °C) 92 19 124/68 98 % 01/29/20 1100  91 22 132/59 99 % 01/29/20 1000  94 23 123/72 100 % 01/29/20 0915     100 % 01/29/20 0900  99 19 117/84 100 % 01/29/20 0800 98.9 °F (37.2 °C) 90 19 116/64 98 % 01/29/20 0700  86 17 113/70 96 % 01/29/20 0600  87 23 114/64 100 % Patient Vitals for the past 96 hrs: 
 Weight  
01/28/20 1432 166 lb (75.3 kg) 01/28/20 0734 166 lb (75.3 kg) 01/27/20 0846 166 lb (75.3 kg) Intake/Output Summary (Last 24 hours) at 1/29/2020 1333 Last data filed at 1/29/2020 1300 Gross per 24 hour Intake 2693.76 ml Output 1225 ml Net 1468.76 ml Physical Exam: 
General:  cooperative, no distress Neck:  nontender, no JVD Lungs:  clear to auscultation bilaterally Heart:  irregularly irregular rhythm Abdomen:  abdomen is soft without significant tenderness, masses, organomegaly or guarding Extremities:  extremities normal, atraumatic, no cyanosis or edema Data Review:  
 
Labs: Results: Chemistry Recent Labs  
  01/29/20 
1158 01/29/20 
0400 01/28/20 
2225  01/27/20 
0913 * 108* 127*   < > 135* * 165* 166*   < > 167* K 4.6 4.2 3.7   < > 4.2 * 137* 138*   < > 135* CO2 25 23 22   < > 27 BUN 35* 36* 36*   < > 58* CREA 1.21 1.21 1.16   < > 1.87* CA 8.3* 8.0* 7.8*   < > 8.7 MG  --   --  2.4  --   --   
PHOS  --   --  2.6  --   --   
AGAP 6 5 6   < > 5  
BUCR 29* 30* 31*   < > 31* AP  --   --   --   --  75  
TP  --   --   --   --  7.3 ALB  --   --   --   --  2.7*  
GLOB  --   --   --   --  4.6* AGRAT  --   --   --   --  0.6*  
 < > = values in this interval not displayed. CBC w/Diff Recent Labs  
  01/29/20 
0400 01/28/20 
0443 01/27/20 
0913 WBC 16.1* 20.6* 18.0*  
RBC 3.65* 3.99* 4.46* HGB 11.5* 12.6* 14.4 HCT 38.2 42.1 47.1  246 264 GRANS 84* 84* 87* LYMPH 8* 10* 8*  
EOS 2 0 0 Cardiac Enzymes No results found for: CPK, CK, CKMMB, CKMB, RCK3, CKMBT, CKNDX, CKND1, JAXON, TROPT, TROIQ, LYDIA, TROPT, TNIPOC, BNP, BNPP Coagulation No results for input(s): PTP, INR, APTT, INREXT in the last 72 hours. Lipid Panel No results found for: CHOL, CHOLPOCT, CHOLX, CHLST, CHOLV, 875770, HDL, HDLP, LDL, LDLC, DLDLP, 533955, VLDLC, VLDL, TGLX, TRIGL, TRIGP, TGLPOCT, CHHD, CHHDX  
BNP No results found for: BNP, BNPP, XBNPT Liver Enzymes Recent Labs  
  01/27/20 
0913 TP 7.3 ALB 2.7* AP 75 SGOT 21  
  
Digoxin Thyroid Studies No results found for: T4, T3U, TSH, TSHEXT Signed By: Allegra Lyle. Shivani Patient, PA-C January 29, 2020

## 2020-01-29 NOTE — PROGRESS NOTES
Discharge/Transition Planning Spoke with wife at bedside and discussed SNF rehab and [address] likes pt to be able to at the least transfer to wheelchair and propel self. Wife agreeable. SNF star rating list given. Choices are thus far [facility](notified her they were closing 3/1 and will only take short term), [facility] and [facility]. Encouraged to pick more places. Referrals sent via [facility] Ney Clayton RN BSN Outcomes Manager Pager # [phone]

## 2020-01-29 NOTE — PROGRESS NOTES
Problem: Dysphagia (Adult) Goal: *Acute Goals and Plan of Care (Insert Text) Description Recommendations: 
Diet: NPO x ice chips only after thorough oral care Meds: via IV Aspiration Precautions Oral Care TID Other: MBS next am 
 
Goals:  Patient will: 1. Tolerate diet and/or diet upgrade without overt s/sx of aspiration under SLP supervision 2. Utilize compensatory swallow strategies of small bite/sip, alternate liquid/solid with min cues 3. Perform oral-motor and laryngeal elevation exercises 10 reps/each to increase oropharyngeal swallow function with min cues 4. Complete an objective swallow study (i.e., MBSS) to assess swallow integrity, r/o aspiration, and determine of safest LRD, min A Outcome: Not Progressing Towards Goal 
  
 
SPEECH LANGUAGE PATHOLOGY BEDSIDE SWALLOW  
EVALUATION & TREATMENT Patient: Nunu Lambert III (15 y.o. male) Date: 1/29/2020 Primary Diagnosis: Sepsis ([facility] [address] [A41.9] Sepsis ([facility] [address] [A41.9] Atrial fibrillation with RVR ([facility] [address] [I48.91] Precautions: aspiration   Fall PLOF: regular/thin ASSESSMENT : 
Based on the objective data described below, the patient presents with moderately-severe oropharyngeal dysphagia in the setting of AMS. Pt alert; oriented to self. Accepted tsp presentations of ice chips; water; nectar-thick; pudding. Across all trials, pt exhibited labored oral bolus prep, transit, delayed decreased hyolaryngeal excursion; including: throat clears; wet vocal quality; decreased O2 sats; teary eyes. At this time, pt not safe for po intake, and should be made NPO and considered high aspiration risk. Pt's spouse at b/s; educated on results and plan for possible  MBS next am if pt medically stable to leave floor. D/w RN. Will follow as indicated.   
 
TREATMENT : 
Skilled therapy initiated; Educated pt on aspiration precautions and importance of compensatory swallow techniques to decrease aspiration risk (decrease rate of intake & sip/bite size, upright @HOB for all po intake and ~30 minutes after po); verbalized comprehension. SLP to follow as indicated. Patient will benefit from skilled intervention to address the above impairments. Patient's rehabilitation potential is considered to be Fair Factors which may influence rehabilitation potential include:  
[]            None noted [x]            Mental ability/status [x]            Medical condition []            Home/family situation and support systems 
[]            Safety awareness 
[]            Pain tolerance/management []            Other: PLAN : 
Recommendations and Planned Interventions: 
NPO; MBS Frequency/Duration: Patient will be followed by speech-language pathology 1-2 times per day/4-7 days per week to address goals. Discharge Recommendations: TBD SUBJECTIVE:  
Patient stated Carie Fields. OBJECTIVE:  
 
Past Medical History:  
Diagnosis Date Allergic rhinitis Dementia ([facility] [address] Dyslipidemia HTN (hypertension) Ill-defined condition   
 hyperlipidemia Paroxysmal atrial fibrillation (HCC) Syncope History reviewed. No pertinent surgical history. Home Situation:  
Home Situation Home Environment: Assisted living Care Facility Name: [place] One/Two Story Residence: One story Living Alone: No 
Support Systems: Assisted living, Family member(s), Spouse/Significant Other/Partner Patient Expects to be Discharged to[de-identified] Unknown Current DME Used/Available at Home: None Diet prior to admission: regular/thin Current Diet:  NPO Cognitive and Communication Status: 
Neurologic State: Alert, Confused Orientation Level: Oriented to person Cognition: Decreased command following, Memory loss Perception: Appears intact Perseveration: No perseveration noted Safety/Judgement: Decreased insight into deficits Oral Assessment: 
Oral Assessment Labial: Decreased rate;Decreased seal 
Dentition: Limited Oral Hygiene: [address] Lingual: Decreased rate;Decreased strength Velum: No impairment Mandible: No impairment P.O. Trials: 
Patient Position: OUN 33 Vocal quality prior to P.O.: Low volume Consistency Presented: Thin liquid; Ice chips; Nectar thick liquid;Pudding How Presented: Spoon;SLP-fed/presented Bolus Acceptance: No impairment Bolus Formation/Control: Impaired Type of Impairment: Delayed;Mastication Propulsion: Delayed (# of seconds) Oral Residue: None Initiation of Swallow: Delayed (# of seconds) Laryngeal Elevation: Decreased Aspiration Signs/Symptoms: Change vocal quality;Clear throat;Decrease in O2 saturations; Watery eyes Pharyngeal Phase Characteristics: Altered vocal quality;Poor endurance; Suspected pharyngeal residue;Multiple swallows Effective Modifications: None Cues for Modifications: Moderate Oral Phase Severity: Moderate Pharyngeal Phase Severity : Severe PAIN: 
Pain level pre-treatment: 0/10 Pain level post-treatment: 0/10 After treatment:  
[]            Patient left in no apparent distress sitting up in chair 
[x]            Patient left in no apparent distress in bed 
[x]            Call bell left within reach [x]            Nursing notified 
[x]            Family present 
[]            Caregiver present 
[]            Bed alarm activated COMMUNICATION/EDUCATION:  
[x]            Aspiration precautions; swallow safety; compensatory techniques. [x]            Patient/family have participated as able in goal setting and plan of care. []            Patient/family agree to work toward stated goals and plan of care. []            Patient understands intent and goals of therapy; neutral about participation. [x]            Patient unable to participate in goal setting/plan of care; educ ongoing with family interdisciplinary staff 
[]         Posted safety precautions in patient's room.  
 
Thank you for this referral. 
Garland Smith, SLP 
 Time Calculation: 27 mins Evaluation Time: 17 minutes Treatment Time: 10 minutes

## 2020-01-29 NOTE — PROGRESS NOTES
1900 Bedside shift change report given to 911 Betty Lima (oncoming nurse) by Ashley Rodriguez (offgoing nurse). Report included the following information SBAR, Kardex, Procedure Summary, Intake/Output, MAR, Accordion, Recent Results, Cardiac Rhythm AFIB, Alarm Parameters  and Dual Neuro Assessment. 2000 Assessment. Hx of dementia, A&Ox1, confused. In AFIB, VSS. No pain noted. Oral care provided, membranes dry, cracked, with dried blood. Old bite noted on tongue. Became confused and yelled to stop. Able to calm client and complete oral care. Tolerated ok. 2100 Awake resting in bed.  
 
2330 6 beat run of VTACH. Dr. Brynn Matute notified placed on electrolyte protocol. 2345 Client had productive cough while drawing blood, able to oral suction dark green/brown material. RT, Coby, notified and assessed PT. Dr. Brynn Matute notified, orders received for NT suction with culture. 0000 Assessment. Kaleb Spence called to clarify prev critical results, blood cx is Gram + with rods. Dr. Brynn Matute notified, covered under current abx, no orders received. 0400 Assessment. No changes 0700 Bedside shift change [facility] [address] [address] [facility] by Ashley Rodriguez (offgoing nurse). Report included the following information SBAR, Kardex, Procedure Summary, Intake/Output, MAR, Accordion, Med Rec Status, Cardiac Rhythm AFIB, Alarm Parameters  and Dual Neuro Assessment.

## 2020-01-29 NOTE — DIABETES MGMT
NUTRITIONAL ASSESSMENT AND GLYCEMIC CONTROL PLAN OF CARE Candee Essex III           78 y.o.           1/27/2020 1. Hypernatremia 2. RAJWINDER (acute kidney injury) ([address] 3. Atrial fibrillation with rapid ventricular response ([address] 4. Dehydration 5. Generalized weakness 6. Confusion 7. Urinary tract infection without hematuria, site unspecified   
 [x]  No Cultural, Orthodox or ethnic dietary need identified. []  Cultural, Orthodox and ethnic food preferences identified and addressed [x]  Participated in discharge planning/Interdisciplinary rounds Food allergies: [x]  No        []  Yes- 
ASSESSMENT:  
Pt is underweight related to inadequate caloric intake as evidenced by 93% ideal weight and BMI= 22.5 kg/m2. Pt is at nutritional risk related to BMI<23kg/m2 for his age. Nutrient deficit related to NPO status, pending SLP evaluation. Altered nutrition related lab values r/t hypernatremia  AEB Na 165. INTERVENTIONS/PLAN:  
 
Monitor for diet advancement. SUBJECTIVE/OBJECTIVE: Information obtained from:Pt's wife reports unintentional weight loss and she is concerned about his swallowing/coughing. She reports no intake since 1/26/20. Diet: npo No data found. Medications: [x]                Reviewed D5 at 50 ml/hr. Most Recent POC Glucose:  
Recent Labs  
  01/29/20 
0400 01/28/20 
2225 01/28/20 
1543 01/28/20 
5597 * 127* 113* 139* Labs:  
No results found for: HBA1C, HGBE8, VKQ1AKNM Lab Results Component Value Date/Time  Sodium 165 (HH) 01/29/2020 04:00 AM  
 Potassium 4.2 01/29/2020 04:00 AM  
 Chloride 137 (H) 01/29/2020 04:00 AM  
 CO2 23 01/29/2020 04:00 AM  
 Anion gap 5 01/29/2020 04:00 AM  
 Glucose 108 (H) 01/29/2020 04:00 AM  
 BUN 36 (H) 01/29/2020 04:00 AM  
 Creatinine 1.21 01/29/2020 04:00 AM  
 Calcium 8.0 (L) 01/29/2020 04:00 AM  
 Magnesium 2.4 01/28/2020 10:25 PM  
 Phosphorus 2.6 01/28/2020 10:25 PM  
 Albumin 2.7 (L) 01/27/2020 09:13 AM  
 
Anthropometrics: IBW : 80.7 kg (178 lb), % IBW (Calculated): 93.26 %, BMI (calculated): 22.5 Wt Readings from Last 1 Encounters:  
01/28/20 75.3 kg (166 lb) Ht Readings from Last 1 Encounters:  
01/28/20 6' (1.829 m) Estimated Nutrition Needs: 2100 Kcals/day, Protein (g): 75 g Fluid (ml): 2000 ml Based on:   [x]          Actual BW    []          IBW   []            Adjusted BW   
 
Nutrition Diagnoses:  
   Underweight and NPO status as stated above. Nutrition Interventions: as stated above Goal:  
  Weight gain 1 lb. per week by 2/8/20. Intake will meet >75% of energy and protein requirements by  2/3/20. Glucose will be within target range of 70-180mg/dl by  . Nutrition Monitoring and Evaluation []     Monitor po intake on meal rounds 
[x]     Continue inpatient monitoring and intervention 
[]     Other: 
 
 
Nutrition Risk:  [x]   High     []  Moderate    []  Minimal/Uncompromised Sara Akins, RD 
pgr [phone]

## 2020-01-29 NOTE — PROGRESS NOTES
Problem: Falls - Risk of 
Goal: *Absence of Falls Description Document Dannygar Brunner Fall Risk and appropriate interventions in the flowsheet. Outcome: Progressing Towards Goal 
Note: Fall Risk Interventions: 
Mobility Interventions: Bed/chair exit alarm Mentation Interventions: Bed/chair exit alarm Medication Interventions: Bed/chair exit alarm Elimination Interventions: Bed/chair exit alarm Problem: Pressure Injury - Risk of 
Goal: *Prevention of pressure injury Description Document Koby Scale and appropriate interventions in the flowsheet. Outcome: Progressing Towards Goal 
Note: Pressure Injury Interventions: 
Sensory Interventions: Assess changes in LOC Moisture Interventions: Apply protective barrier, creams and emollients Activity Interventions: Pressure redistribution bed/mattress(bed type) Mobility Interventions: HOB 30 degrees or less Nutrition Interventions: Document food/fluid/supplement intake, Discuss nutritional consult with provider Friction and Shear Interventions: Apply protective barrier, creams and emollients Problem: General Medical Care Plan Goal: *Vital signs within specified parameters Outcome: Progressing Towards Goal 
Goal: *Optimal pain control at patient's stated goal 
Outcome: Progressing Towards Goal 
Goal: *Skin integrity maintained Outcome: Progressing Towards Goal

## 2020-01-29 NOTE — PROGRESS NOTES
0725: Bedside and verbal report received from [employer]. Pt awake, in bed, pleasantly confused. VSS, care assumed. 7953: Pharmacy consulted to adjust Pt gtts to be made in D5 instead of NS per An Fowler. 
0910: An Fowler at the bedside, wife updated with the POC.  
1206: An Fowler at the bedside, Pt's code status DNR. 1930: Bedside and Verbal shift change report given to [address] (oncoming nurse) by Rock Ferrell RN 
 (offgoing nurse). Report included the following information SBAR, Kardex, Intake/Output, MAR, Recent Results and Cardiac Rhythm A-fib.

## 2020-01-29 NOTE — PROGRESS NOTES
Problem: Self Care Deficits Care Plan (Adult) Goal: *Acute Goals and Plan of Care (Insert Text) Description Occupational Therapy Goals Initiated 1/29/2020 within 7 day(s). 1.  Patient will perform sitting at the edge of bed with minimal assistance for 5 minutes in preparation for participation in grooming tasks. 2.  Patient will perform upper body dressing with maximal assistance. 3.  Patient will perform rolling and maintaining side lying for 2 minutes per side for toilet hygiene and linen changes with moderate assistance . 4. Patient will participate in upper extremity therapeutic exercise/activities with moderate assistance  for 5 minutes. Prior Level of Function: Patient was living in the Memory Care Unit at [facility] and was receiving maximal assistance for ADLs. Patient was able to sit at the edge of bed and stand while holding on to improve participation for toilet hygiene. Outcome: Progressing Towards Goal 
 OCCUPATIONAL THERAPY EVALUATION Patient: Kaycee Bran III (71 y.o. male) Date: 1/29/2020 Primary Diagnosis: Sepsis ([address] [A41.9] Sepsis ([address] [A41.9] Atrial fibrillation with RVR ([facility] [I48.91] Precautions:   Fall PLOF: See above ASSESSMENT : 
Based on the objective data described below, the patient presents in bed. Nursing cleared patient for participation in OT assessment at this time. Patient was seen with PT and PT student to maximize safety and participation. Patient was pleasantly confused and unable to provide accurate history. Spouse arrived during assessment and was able to provide information regarding residence and PLOF. Patient required Total Assistance X2 for bed mobility from supine to and from sitting and Total assistance for rolling and scooting in bed today. Patient demonstrating Poor sitting balance and required MAX A to maintain upright posture at edge of bed.   Skilled intervention to maximize functional participation for ADLs is recommended during admission and patient would benefit from continued long term care to meet needs following discharge. Patient will benefit from skilled intervention to address the above impairments. Patient's rehabilitation potential is considered to be Guarded Factors which may influence rehabilitation potential include:  
[]             None noted [x]             Mental ability/status [x]             Medical condition []             Home/family situation and support systems []             Safety awareness []             Pain tolerance/management 
[]             Other: PLAN : 
Recommendations and Planned Interventions:  
[x]               Self Care Training                  [x]      Therapeutic Activities [x]               Functional Mobility Training   []      Cognitive Retraining 
[x]               Therapeutic Exercises           [x]      Endurance Activities []               Balance Training                    []      Neuromuscular Re-Education []               Visual/Perceptual Training     []      Home Safety Training 
[x]               Patient Education                   []      Family Training/Education []               Other (comment): Frequency/Duration: Patient will be followed by occupational therapy 3-5 times a week to address goals. Discharge Recommendations: [facility] (LTC) Further Equipment Recommendations for Discharge: TBD next level of care SUBJECTIVE:  
Patient stated Pj Hughes is so beautiful.  (in regard to spouse) OBJECTIVE DATA SUMMARY:  
 
Past Medical History:  
Diagnosis Date Allergic rhinitis Dementia ([facility] [address] Dyslipidemia HTN (hypertension) Ill-defined condition   
 hyperlipidemia Paroxysmal atrial fibrillation (HCC) Syncope History reviewed. No pertinent surgical history. Barriers to Learning/Limitations: yes;  cognitive and altered mental status (i.e.Sedation, Confusion) Compensate with: visual, verbal, tactile, kinesthetic cues/model Home Situation:  
Home Situation Home Environment: Assisted living Care Facility Name: [facility] One/Two Story Residence: One story Living Alone: No 
Support Systems: Assisted living, Family member(s), Spouse/Significant Other/Partner Patient Expects to be Discharged to[de-identified] Unknown Current DME Used/Available at Home: None 
[x]  Right hand dominant   []  Left hand dominant Cognitive/Behavioral Status: 
Neurologic State: Alert;Confused Orientation Level: Oriented to person Cognition: Decreased command following;Memory loss Safety/Judgement: Decreased insight into deficits Skin: Increased redness on B hands Edema: B hands R>L Coordination: BUE Fine Motor Skills-Upper: Left Impaired;Right Impaired Gross Motor Skills-Upper: Left Impaired;Right Impaired Balance: 
Sitting: Impaired Sitting - Static: Poor (constant support) Strength: BUE Strength: Grossly decreased, non-functional 
 
Tone & Sensation: BUE Tone: Normal 
Sensation: Intact Range of Motion: BUE 
AROM: Grossly decreased, non-functional 
 
Functional Mobility and Transfers for ADLs: 
Bed Mobility: 
Rolling: Total assistance Supine to Sit: Total assistance;Assist x2 Sit to Supine: Total assistance;Assist x2 Scooting: Total assistance ADL Assessment:  
Feeding: Total assistance Oral Facial Hygiene/Grooming: Total assistance Bathing: Total assistance Upper Body Dressing: Total assistance Lower Body Dressing: Total assistance Cognitive Retraining Safety/Judgement: Decreased insight into deficits Pain: 
Pain level pre-treatment: 0/10 Pain level post-treatment: 0/10 Pain Intervention(s): Medication (see MAR); Rest, Ice, Repositioning Response to intervention: Nurse notified, See doc flow Activity Tolerance:  
Poor Please refer to the flowsheet for vital signs taken during this treatment. After treatment: [] Patient left in no apparent distress sitting up in chair 
[x] Patient left in no apparent distress in bed 
[x] Call bell left within reach [x] Nursing notified 
[] Caregiver present 
[] Bed alarm activated COMMUNICATION/EDUCATION:  
[x] Role of Occupational Therapy in the acute care setting 
[] Home safety education was provided and the patient/caregiver indicated understanding. [] Patient/family have participated as able in goal setting and plan of care. [] Patient/family agree to work toward stated goals and plan of care. [] Patient understands intent and goals of therapy, but is neutral about his/her participation. [x] Patient is unable to participate in goal setting and plan of care. Thank you for this referral. 
Pako Mcghee OTR/L Time Calculation: 25 mins Eval Complexity: History: HIGH Complexity : Extensive review of history including physical, cognitive and psychosocial history ; Examination: HIGH Complexity : 5 or more performance deficits relating to physical, cognitive , or psychosocial skils that result in activity limitations and / or participation restrictions; Decision Making:HIGH Complexity : Patient presents with comorbidities that affect occupational performance. Signifigant modification of tasks or assistance (eg, physical or verbal) with assessment (s) is necessary to enable patient to complete evaluation

## 2020-01-29 NOTE — PROGRESS NOTES
Problem: Discharge Planning Goal: *Discharge to safe environment Outcome: Progressing Towards Goal 
  
Problem: Falls - Risk of 
Goal: *Absence of Falls Description Document Adin Santiago Fall Risk and appropriate interventions in the flowsheet. Outcome: Progressing Towards Goal 
Note: Fall Risk Interventions: 
Mobility Interventions: Bed/chair exit alarm Mentation Interventions: Bed/chair exit alarm Medication Interventions: Bed/chair exit alarm Elimination Interventions: Bed/chair exit alarm Problem: Patient Education: Go to Patient Education Activity Goal: Patient/Family Education Outcome: Progressing Towards Goal 
  
Problem: Pressure Injury - Risk of 
Goal: *Prevention of pressure injury Description Document Koby Scale and appropriate interventions in the flowsheet. Outcome: Progressing Towards Goal 
Note: Pressure Injury Interventions: 
Sensory Interventions: Assess changes in LOC Moisture Interventions: Apply protective barrier, creams and emollients Activity Interventions: Pressure redistribution bed/mattress(bed type) Mobility Interventions: HOB 30 degrees or less Nutrition Interventions: Document food/fluid/supplement intake, Discuss nutritional consult with provider Friction and Shear Interventions: Apply protective barrier, creams and emollients Problem: Patient Education: Go to Patient Education Activity Goal: Patient/Family Education Outcome: Progressing Towards Goal

## 2020-01-30 NOTE — PROGRESS NOTES
[facility] - Progress Note Admit Date: 1/27/2020 Patient seen and examined independently. Await swallowing evaluation to be done later today. Will transition to p.o. meds for rate control when p.o. route available. Agree with assessment and plan as noted below. Isaías Duffy MD 
Assessment:  
 
Hospital Problems  Date Reviewed: 1/27/2020 Codes Class Noted POA Metabolic encephalopathy SXX-17-UB: G93.41 
ICD-9-CM: 348.31  1/29/2020 Yes Overview Signed 1/29/2020  6:56 AM by Randal Bartlett MD  
  In the setting of dementia Hypernatremia ICD-10-CM: E87.0 ICD-9-CM: 276.0  1/27/2020 Yes * (Principal) Sepsis ([facility] ICD-10-CM: A41.9 ICD-9-CM: 038.9, 995.91  1/27/2020 Yes  
   
 UTI (urinary tract infection) ICD-10-CM: N39.0 ICD-9-CM: 599.0  1/27/2020 Yes Atrial fibrillation with RVR (HCC) ICD-10-CM: I48.91 
ICD-9-CM: 427.31  1/27/2020 Unknown Essential hypertension ICD-10-CM: I10 
ICD-9-CM: 401.9  3/15/2017 Yes Dyslipidemia ICD-10-CM: E78.5 ICD-9-CM: 272.4  3/15/2017 Yes Dementia ([facility] ICD-10-CM: F03.90 ICD-9-CM: 294.20  3/15/2017 Yes  
   
  
 
- Urosepsis, E. Coli confirmed on urine culture. Sepsis improving - Atrial fibrillation with RVR, known chronic afib. Not on anticoagulation due to falls and syncope hx 
- Echo 01/28/20 showing EF 40-45%, LV global hypokinesis - Hyperlipidemia 
- History of falls and syncope - Chronic dysphagia, wife reports he cannot swallow pills - Dementia - DNR status 
  
Plan:  
 
- Continued on Cardizem drip, afib rate controlled. - High aspiration risk per speech pathology assessment, plans for barium swallow today. Unable to take PO medications at this point. Subjective: No new complaints. Objective:  
  
Patient Vitals for the past 8 hrs: 
 Pulse Resp BP SpO2  
01/30/20 0700 89 23 123/69 99 % 01/30/20 0645 89 23  100 % 01/30/20 0630 90 19  94 % 01/30/20 0615 91 26  100 % 01/30/20 0600 99 17 133/70 98 % 01/30/20 0545 85 25  93 % 01/30/20 0530 90 27  99 % 01/30/20 0515 88 21  100 % 01/30/20 0500 90 24 (!) 127/92 99 % 01/30/20 0445 91 22  97 % 01/30/20 0430 89 19  99 % 01/30/20 0415 90 21  98 % 01/30/20 0400 88 22 129/66 100 % 01/30/20 0345 91 22  97 % 01/30/20 0330 89 22  100 % 01/30/20 0315 100 21  98 % 01/30/20 0300 88 25 116/73   
01/30/20 0245 94 16  98 % 01/30/20 0230 86 16  98 % 01/30/20 0215 92 23  98 % 01/30/20 0200 88 20 127/78 99 % 01/30/20 0145 89 22  99 % 01/30/20 0130 92 26  98 % 01/30/20 0115 99 27  99 % 01/30/20 0100 86 23 130/89 97 % 01/30/20 0045 96 24  99 % 01/30/20 0030 86 20  99 % 01/30/20 0015 93 23  99 % Patient Vitals for the past 96 hrs: 
 Weight  
01/28/20 1432 166 lb (75.3 kg) 01/28/20 0734 166 lb (75.3 kg) 01/27/20 0846 166 lb (75.3 kg) Intake/Output Summary (Last 24 hours) at 1/30/2020 2118 Last data filed at 1/30/2020 0600 Gross per 24 hour Intake 2204.51 ml Output 1515 ml Net 689.51 ml Physical Exam: 
General:  cooperative, no distress Neck:  nontender, no JVD Lungs:  clear to auscultation bilaterally Heart:  irregularly irregular rhythm Abdomen:  abdomen is soft without significant tenderness, masses, organomegaly or guarding Extremities:  extremities normal, atraumatic, no cyanosis or edema Data Review:  
 
Labs: Results:  
   
Chemistry Recent Labs  
  01/30/20 
0200 01/29/20 
2330 01/29/20 
1823  01/28/20 
2225  01/27/20 
0913 * 123* 110*   < > 127*   < > 135* * 158* 160*   < > 166*   < > 167* K 4.0 4.2 3.8   < > 3.7   < > 4.2 * 131* 132*   < > 138*   < > 135* CO2 20* 19* 22   < > 22   < > 27 BUN 28* 27* 30*   < > 36*   < > 58* CREA 0.98 1.04 1.13   < > 1.16   < > 1.87* CA 7.9* 8.0* 8.2*   < > 7.8*   < > 8.7 MG 2.3  --   --   --  2.4  --   --   
PHOS 2.9  --   --   --  2.6  --   --   
 AGAP 8 8 6   < > 6   < > 5  
BUCR 29* 26* 27*   < > 31*   < > 31* AP  --   --   --   --   --   --  75  
TP  --   --   --   --   --   --  7.3 ALB  --   --   --   --   --   --  2.7*  
GLOB  --   --   --   --   --   --  4.6* AGRAT  --   --   --   --   --   --  0.6*  
 < > = values in this interval not displayed. CBC w/Diff Recent Labs  
  01/30/20 
0200 01/29/20 
0400 01/28/20 
0443 WBC 15.2* 16.1* 20.6*  
RBC 3.49* 3.65* 3.99* HGB 10.9* 11.5* 12.6* HCT 35.7* 38.2 42.1  232 246 GRANS 85* 84* 84* LYMPH 8* 8* 10* EOS 1 2 0 Cardiac Enzymes No results found for: CPK, CK, CKMMB, CKMB, RCK3, CKMBT, CKNDX, CKND1, JAXON, TROPT, TROIQ, LYDIA, TROPT, TNIPOC, BNP, BNPP Coagulation No results for input(s): PTP, INR, APTT, INREXT in the last 72 hours. Lipid Panel No results found for: CHOL, CHOLPOCT, CHOLX, CHLST, CHOLV, 123616, HDL, HDLP, LDL, LDLC, DLDLP, 899730, VLDLC, VLDL, TGLX, TRIGL, TRIGP, TGLPOCT, CHHD, CHHDX  
BNP No results found for: BNP, BNPP, XBNPT Liver Enzymes Recent Labs  
  01/27/20 
0913 TP 7.3 ALB 2.7* AP 75 SGOT 21  
  
Digoxin Thyroid Studies No results found for: T4, T3U, TSH, TSHEXT Signed By: Jennyfer Rey. Reji Grace PA-C January 30, 2020

## 2020-01-30 NOTE — PROGRESS NOTES
1529: Bedside and verbal report received from [facility]. Pt pleasantly confused. Cardizem gtt verified. Wife at the bedside. VSS, pt resting quietly. Care assumed, will monitor. 1930: Bedside and Verbal shift change report given to Ailyn ANDREWS (oncoming nurse) by Madeline Bay RN 
 (offgoing nurse). Report included the following information SBAR, Kardex, Intake/Output, MAR, Recent Results and Cardiac Rhythm A fib.

## 2020-01-30 NOTE — PROGRESS NOTES
Problem: Dysphagia (Adult) Goal: *Acute Goals and Plan of Care (Insert Text) Description Recommendations: 
Diet: NPO x ice chips only after thorough oral care Meds: via IV Aspiration Precautions Oral Care TID Goals:  Patient will: 1. Tolerate diet and/or diet upgrade without overt s/sx of aspiration under SLP supervision 2. Utilize compensatory swallow strategies of small bite/sip, alternate liquid/solid with min cues 3. Perform oral-motor and laryngeal elevation exercises 10 reps/each to increase oropharyngeal swallow function with min cues 4. Complete an objective swallow study (i.e., MBSS) to assess swallow integrity, r/o aspiration, and determine of safest LRD, min A Outcome: Not Progressing Towards Goal 
  
SPEECH PATHOLOGY MODIFIED BARIUM SWALLOW STUDY & TREATMENT Patient: Twila Iglesias III (9 y.o. male) Date: 1/30/2020 Primary Diagnosis: Sepsis ([facility] [A41.9] Sepsis ([facility] [A41.9] Atrial fibrillation with RVR ([facility] [I48.91] Precautions: aspiration  Fall PLOF: regular/thin ASSESSMENT : 
MBS completed with blake aspiration across all study trials, to include: thin +/- straw; nectar-thick +/- straw; pudding. All aspiration incidents occurred at time of, and after swallow on residuals. Honey-thick liquids not attempted as pt with increased difficulty clearing residuals with thicker viscosity trials. Deficits included labored oral bolus prep and transit with premature spillage into valleculae and pyriforms; swallow delays of >45 seconds; decreased hyolaryngeal excursion with no epiglottic inversion. Thus resulting in airway compromise and large volume residuals in valleculae, pyriforms, and posterior pharyngeal wall. Pt with decreased laryngeal sensation and is unable to produce multiple swallows to clear residuals despite max stimulation to trigger swallow. Pt presents with severe oropharyngeal dysphagia, as evidenced above.  Pt is at very high risk for aspiration despite diet modification to puree with honey-thick liquids. Rec QOL/comfort diet vs PEG; however, given advanced age and multiple comorbidities, preferred recommendation is initiation of QOL/comfort diet. SLP utilized video of study for visual feedback and recommendations for pt/RN; verbalized comprehension. Treatment: 
Skilled therapy initiated: Educated pt/RN on MBS results, aspiration precautions, and importance of compensatory swallow techniques to decrease aspiration risk (decrease rate of intake & sip/bite size, upright @HOB for all po intake and ~30 minutes after po); verbalized comprehension. SLP to follow as indicated. Patient will benefit from skilled intervention to address the above impairments. Patient's rehabilitation potential is considered to be Guarded Factors which may influence rehabilitation potential include:  
[]              None noted [x]              Mental ability/status [x]              Medical condition []              Home/family situation and support systems []              Safety awareness []              Pain tolerance/management 
[]              Other: PLAN : 
Recommendations and Planned Interventions: 
NPO; PEG vs QOL diet Frequency/Duration: Patient will be followed by speech-language pathology 1-2 times per day/4-7 days per week to address goals. Discharge Recommendations: [facility] SUBJECTIVE:  
Patient stated i'm here. OBJECTIVE:  
 
Past Medical History:  
Diagnosis Date Allergic rhinitis Dementia ([facility] Dyslipidemia HTN (hypertension) Ill-defined condition   
 hyperlipidemia Paroxysmal atrial fibrillation (HCC) Syncope History reviewed. No pertinent surgical history. Home Situation:  
Home Situation Home Environment: Assisted living Care Facility Name: [facility] One/Two Story Residence: One story Living Alone: No 
 Support Systems: Assisted living, Family member(s), Spouse/Significant Other/Partner Patient Expects to be Discharged to[de-identified] Unknown Current DME Used/Available at Home: None Diet prior to admission: regular/thin Current Diet:  NPO; PEG vs QOL diet Radiologist: [facility] Film Views: Lateral 
Patient Position: 90 
 
8-point Penetration-Aspiration Scale: Score 7 PAIN: 
Pain level pre-treatment: 0/10 Pain level post-treatment: 0/10 COMMUNICATION/EDUCATION:  
[x]  Patient educated regarding MBS results and diet recommendations. [x]  Patient/family have participated as able in goal setting and plan of care. []  Patient/family agree to work toward stated goals and plan of care. []  Patient understands intent and goals of therapy, but is neutral about his/her participation. [x]  Patient is unable to participate in goal setting and plan of care; ongoing with RN at b/s. Thank you for this referral. 
NICOLASA Guy Time Calculation: 26 mins MBS Time: 17 minutes Treatment Time: 9 minutes

## 2020-01-30 NOTE — ROUTINE PROCESS
Pt remains pleasantly confused. Moves all ext's with generalized weakness throughout. Continuing to titrate cardizem per order. Remains in a-fib. Denies pain. 0200 Continue to reposition Q2 hour per protocol. AM labs completed. 0745  Bedside report given to DARRYL guzmán. Pt turned, jace care completed. Vonda Spivey

## 2020-01-30 NOTE — DIABETES MGMT
NUTRITIONAL ASSESSMENT AND GLYCEMIC CONTROL PLAN OF CARE Katina Flores III           78 y.o.           1/27/2020 1. Hypernatremia 2. RAJWINDER (acute kidney injury) ([facility] 3. Atrial fibrillation with rapid ventricular response ([facility] 4. Dehydration 5. Generalized weakness 6. Confusion 7. Urinary tract infection without hematuria, site unspecified   
  
 [x]  Participated in discharge planning/Interdisciplinary rounds Food allergies: [x]  No        []  Yes- 
ASSESSMENT:  
Pt is underweight related to inadequate caloric intake as evidenced by 93% ideal weight and BMI= 22.5 kg/m2. Pt is at nutritional risk related to BMI<23kg/m2 for his age. Nutrient deficit related to oropharyngeal dysphagia aeb  NPO status, pending SLP M Ba swallow evaluation. Altered nutrition related lab values r/t hypernatremia  AEB Na 158. Pt with stage 2 sacral pressure ulcer. INTERVENTIONS/PLAN:  
Monitor following results of modified  Barium swallow. SUBJECTIVE/OBJECTIVE: Information obtained from:Pt's wife -She reports no intake since 1/26/20. Pt's wife mentioned that she and pt do not want enteral feeding. Diet: npo No data found. Medications: [x]                Reviewed D5 at 100 ml/hr. Most Recent POC Glucose:  
Recent Labs  
  01/30/20 
0730 01/30/20 
0200 01/29/20 
2330 01/29/20 
1823 GLU 98 121* 123* 110* Labs:  
No results found for: HBA1C, HGBE8, SAN6OLBR, AEA0OHOK Lab Results Component Value Date/Time  Sodium 158 (H) 01/30/2020 07:30 AM  
 Potassium 3.9 01/30/2020 07:30 AM  
 Chloride 127 (H) 01/30/2020 07:30 AM  
 CO2 23 01/30/2020 07:30 AM  
 Anion gap 8 01/30/2020 07:30 AM  
 Glucose 98 01/30/2020 07:30 AM  
 BUN 27 (H) 01/30/2020 07:30 AM  
 Creatinine 0.99 01/30/2020 07:30 AM  
 Calcium 8.1 (L) 01/30/2020 07:30 AM  
 Magnesium 2.3 01/30/2020 02:00 AM  
 Phosphorus 2.9 01/30/2020 02:00 AM  
 Albumin 2.7 (L) 01/27/2020 09:13 AM  
 
 Anthropometrics: IBW : 80.7 kg (178 lb), % IBW (Calculated): 93.26 %, BMI (calculated): 22.5 Wt Readings from Last 1 Encounters:  
01/28/20 75.3 kg (166 lb) Ht Readings from Last 1 Encounters:  
01/28/20 6' (1.829 m) Estimated Nutrition Needs: 2100 Kcals/day, Protein (g): 75 g Fluid (ml): 2000 ml Based on:   [x]          Actual BW    []          IBW   []            Adjusted BW   
 
Nutrition Diagnoses:  
   Underweight and NPO status as stated above. Nutrition Interventions: as stated above Goal:  
  Weight gain 1 lb. per week by 2/9/20. Intake will meet >75% of energy and protein requirements by  2/4/20. Glucose will be within target range of 70-180mg/dl by  2/2/20-met. Nutrition Monitoring and Evaluation []     Monitor po intake on meal rounds 
[x]     Continue inpatient monitoring and intervention 
[]     Other: 
 
 
Nutrition Risk:  [x]   High     []  Moderate    []  Minimal/Uncompromised Richard Martinez RD 
[phone]

## 2020-01-30 NOTE — HOSPICE
Referral received. Chart review in process. Thank you for the referral to [facility]. Please contact [phone] with any questions or concerns. Isabela Cano, SAMMYN, RN Clinical Coordinator [place] [address] [place], [facility] 
[phone]

## 2020-01-30 NOTE — PROGRESS NOTES
Internal Medicine Progress Note Patient's Name: Stephania Singer III Admit Date: 1/27/2020 Length of Stay: 4 Assessment/Plan Principal Problem: 
  Sepsis ([facility] (1/27/2020) Active Problems: 
  UTI (urinary tract infection) (1/27/2020) Metabolic encephalopathy (4/59/3603) Overview: In the setting of dementia Atrial fibrillation with RVR ([facility] (1/27/2020) Anxiety (1/31/2020) Primary osteoarthritis involving multiple joints (1/31/2020) Essential hypertension (3/15/2017) Dyslipidemia (3/15/2017) Dementia ([facility] (3/15/2017) Hypernatremia (1/27/2020) Sepsis 2/2 UTI 
- cont IV abx 
- 1/2 BCx GPR 
- repeat BCx today 
- UCx with pan-susc Ecoli 
- monitor CBC Afib RVR 
- appreciate cardiology 
- cont cardizem gtt until swallow function is evaluated Hypernatremia 
- minimize normal saline 
- cont D5 
- monitor BMP q6 - improving 
- avoid decreasing >8meq in 24 hours Dysphagia - appreciate ST 
- patient failed MBS 
- speech recs PEG vs comfort diet - patient's MPOA states no feeding tube 
- hospice consulted - Cont acceptable home medications for chronic conditions  
- DVT protocol I have personally reviewed all pertinent labs and films that have officially resulted over the last 24 hours. I have personally checked for all pending labs that are awaiting final results. HPI Per H&P, \"Cyrus Mcleod III is a 78 y.o. male who was sent to the ER from [facility] for evaluation of decreased mental status. His wife reports that the had fever and that he had decreased activity level. He was more combative than he typically was. He did not have cough. He did not have nausea or vomiting. No diarrhea. He presented to the ER where he was found to have altered mental status. He also has UTI. In the ER he was also found to have atrial fibrillation with Rapid ventricular response. His BP was preserved.   He will be admitted for ongoing management. \" Hospital Course Afib initially managed with cardizem gtt. Hypernatremia managed with IV fluids. Speech therapy consulted to evaluate dysphagia. MBS failed across all trials. Per patient's wife, his ability to feed himself has declined over the past few months, and he would not want a feeding tube. She requests hospice consult. Subjective Pt s/e @ bedside. No major events overnight. Pt more alert today. Wife at bedside. Objective Visit Vitals /44 Pulse 97 Temp 100.3 °F (37.9 °C) Resp 23 Ht 6' (1.829 m) Wt 80.4 kg (177 lb 4.8 oz) SpO2 100% BMI 24.05 kg/m² Physical Exam: 
General Appearance: NAD, confused HENT: normocephalic/atraumatic, moist mucus membranes Neck: No JVD, supple Lungs: CTA with normal respiratory effort CV: IRIR, no m/r/g Abdomen: soft, non-tender, normal bowel sounds Extremities: no cyanosis, no peripheral edema Neuro: No focal deficits, motor/sensory intact Skin: Normal color, intact Intake and Output: 
Current Shift:  01/31 0701 - 01/31 1900 In: 1041.6 [I.V.:1041.6] Out: 275 [Urine:275] Last three shifts:  01/29 1901 - 01/31 0700 In: 4678.6 [I.V.:4678.6] Out: 2790 [Urine:2790] Lab/Data Reviewed: 
BMP:  
Lab Results Component Value Date/Time  01/31/2020 08:10 AM  
 K 4.2 01/31/2020 08:10 AM  
  (H) 01/31/2020 08:10 AM  
 CO2 22 01/31/2020 08:10 AM  
 AGAP 8 01/31/2020 08:10 AM  
  (H) 01/31/2020 08:10 AM  
 BUN 20 (H) 01/31/2020 08:10 AM  
 CREA 0.90 01/31/2020 08:10 AM  
 GFRAA >60 01/31/2020 08:10 AM  
 GFRNA >60 01/31/2020 08:10 AM  
 
CBC:  
Lab Results Component Value Date/Time WBC 15.2 (H) 01/31/2020 12:40 AM  
 HGB 12.6 (L) 01/31/2020 12:40 AM  
 HCT 39.7 01/31/2020 12:40 AM  
  01/31/2020 12:40 AM  
 
 
Imaging Reviewed: Xr Swallow Func Video Result Date: 1/30/2020 Barium speech study History: Cough, dysphagia, feeding difficulties. Barium pharyngogram performed under the guidance of speech therapist, with fluoroscopic guidance by Jose Rene. Thin barium via straw, barium nectar via straw, and semisolid barium pudding administered. Fluoro Dose (reference air kerma): 14.48 mGy. Findings: Thin barium: Aspiration with cough reflex noted. Residual in the vallecula and pyriform sinus. Nectar: Oral and swallowing delay with premature spillage. No laryngeal penetration. Residual in the vallecula. Pudding: Oral and swallowing delay with premature spillage. Residual in the vallecula and pyriform sinus with subsequent aspiration with cough of residuals. No epiglottic inversion was noted. Impression: Abnormal examination as detailed above with gross aspiration. Please refer to the full report from speech pathologist submitted separately. Medications Reviewed: 
Current Facility-Administered Medications Medication Dose Route Frequency  LORazepam (INTENSOL) 2 mg/mL oral concentrate 1 mg  1 mg Oral Q1H PRN  
 morphine (ROXANOL) 100 mg/5 mL (20 mg/mL) concentrated solution 10 mg  10 mg Oral Q1H PRN  
 levoFLOXacin (LEVAQUIN) 750 mg in D5W IVPB  750 mg IntraVENous Q24H  
 [START ON 2/1/2020] VANCOMYCIN INFORMATION NOTE   Other ONCE  
 vancomycin (VANCOCIN) 1,250 mg in dextrose 5% 250 mL IVPB (pharmacy compound)  1,250 mg IntraVENous Q18H  
 dilTIAZem (CARDIZEM) 100 mg in dextrose 5% (MBP/ADV) 100 mL infusion  0-15 mg/hr IntraVENous TITRATE  cefTRIAXone (ROCEPHIN) 1 g in dextrose 5% (50mL- Pharmacy Compound  1 g IntraVENous Q24H  
 QUEtiapine (SEROquel) tablet 25 mg  25 mg Oral DAILY  VANCOMYCIN INFORMATION NOTE   Other Rx Dosing/Monitoring  influenza vaccine 2019-20 (6 mos+)(PF) (FLUARIX/FLULAVAL/FLUZONE QUAD) injection 0.5 mL  0.5 mL IntraMUSCular PRIOR TO DISCHARGE  dextrose 5% infusion  100 mL/hr IntraVENous CONTINUOUS  
 enoxaparin (LOVENOX) injection 40 mg  40 mg SubCUTAneous Q24H  ELECTROLYTE REPLACEMENT PROTOCOL - Potassium Standard Dosing   1 Each Other PRN  
 ELECTROLYTE REPLACEMENT PROTOCOL - Magnesium   1 Each Other PRN  
 ELECTROLYTE REPLACEMENT PROTOCOL - Phosphorus  Standard Dosing  1 Each Other PRN  
 ELECTROLYTE REPLACEMENT PROTOCOL - Calcium   1 Each Other PRN  
 sodium chloride (NS) flush 5-10 mL  5-10 mL IntraVENous PRN Babita Jeffers PA-C [phone] Hospitalist Division Office:  [phone] Pager: [phone]

## 2020-01-31 PROBLEM — F41.9 ANXIETY: Status: ACTIVE | Noted: 2020-01-01

## 2020-01-31 PROBLEM — M15.9 PRIMARY OSTEOARTHRITIS INVOLVING MULTIPLE JOINTS: Status: ACTIVE | Noted: 2020-01-01

## 2020-01-31 NOTE — ANCILLARY DISCHARGE INSTRUCTIONS
Patient and/or next of kin has been given the [facility] Important Message From Medicare About Your Rights\" letter and all questions were answered.

## 2020-01-31 NOTE — PROGRESS NOTES
[facility] - Progress Note Admit Date: 1/27/2020 Assessment:  
 
Hospital Problems  Date Reviewed: 1/27/2020 Codes Class Noted POA Anxiety ICD-10-CM: F41.9 ICD-9-CM: 300.00  1/31/2020 Yes Primary osteoarthritis involving multiple joints ICD-10-CM: M15.0 ICD-9-CM: 715.09  1/31/2020 Yes Metabolic encephalopathy CXL-12-BY: G93.41 
ICD-9-CM: 348.31  1/29/2020 Yes Overview Signed 1/29/2020  6:56 AM by Emir Coley MD  
  In the setting of dementia Hypernatremia ICD-10-CM: E87.0 ICD-9-CM: 276.0  1/27/2020 Yes * (Principal) Sepsis ([facility] ICD-10-CM: A41.9 ICD-9-CM: 038.9, 995.91  1/27/2020 Yes  
   
 UTI (urinary tract infection) ICD-10-CM: N39.0 ICD-9-CM: 599.0  1/27/2020 Yes Atrial fibrillation with RVR (HCC) ICD-10-CM: I48.91 
ICD-9-CM: 427.31  1/27/2020 Unknown Essential hypertension ICD-10-CM: I10 
ICD-9-CM: 401.9  3/15/2017 Yes Dyslipidemia ICD-10-CM: E78.5 ICD-9-CM: 272.4  3/15/2017 Yes Dementia ([facility] ICD-10-CM: F03.90 ICD-9-CM: 294.20  3/15/2017 Yes  
   
  
 
- Urosepsis, E. Coli confirmed on urine culture. Sepsis improving - Atrial fibrillation with RVR, known chronic afib. Not on anticoagulation due to falls and syncope hx 
- Echo 01/28/20 showing EF 40-45%, LV global hypokinesis - Hyperlipidemia 
- History of falls and syncope - Chronic dysphagia, wife reports he cannot swallow pills - Dementia - DNR status 
  
Plan:  
 
Discussed with nursing staff. Plan for hospice care noted. Patient will be transferred back to facility with hospice care. We will be available as needed. Please call with question Subjective: No new complaints. Objective:  
  
Patient Vitals for the past 8 hrs: 
 Temp Pulse Resp BP SpO2  
01/31/20 1500  96 23 117/69 100 % 01/31/20 1400  99 22 120/76 100 % 01/31/20 1300  (!) 102 23 115/78 100 % 01/31/20 1200 99.7 °F (37.6 °C) 98 26 140/76 100 % 01/31/20 1100  97 23 128/44 100 % 01/31/20 1000  100 26 102/58 98 % 01/31/20 0900  (!) 103 25 131/61 98 % Patient Vitals for the past 96 hrs: 
 Weight 01/30/20 2318 177 lb 4.8 oz (80.4 kg) 01/28/20 1432 166 lb (75.3 kg) 01/28/20 0734 166 lb (75.3 kg) Intake/Output Summary (Last 24 hours) at 1/31/2020 1122 Last data filed at 1/31/2020 1300 Gross per 24 hour Intake 3046.58 ml Output 1775 ml Net 1271.58 ml Physical Exam: 
General:  cooperative, no distress Neck:  nontender, no JVD Lungs:  clear to auscultation bilaterally Heart:  irregularly irregular rhythm Abdomen:  abdomen is soft without significant tenderness, masses, organomegaly or guarding Extremities:  extremities normal, atraumatic, no cyanosis or edema Data Review:  
 
Labs: Results:  
   
Chemistry Recent Labs  
  01/31/20 
0810 01/31/20 
0040 01/30/20 
1847  01/30/20 
0200  01/28/20 
2225 * 120* 109*   < > 121*   < > 127*  149* 152*   < > 159*   < > 166* K 4.2 3.8 4.0   < > 4.0   < > 3.7 * 117* 121*   < > 131*   < > 138* CO2 22 23 22   < > 20*   < > 22 BUN 20* 22* 23*   < > 28*   < > 36* CREA 0.90 0.95 0.90   < > 0.98   < > 1.16  
CA 8.8 8.2* 8.2*   < > 7.9*   < > 7.8*  
MG  --  2.3  --   --  2.3  --  2.4 PHOS  --  3.2  --   --  2.9  --  2.6 AGAP 8 9 9   < > 8   < > 6  
BUCR 22* 23* 26*   < > 29*   < > 31*  
 < > = values in this interval not displayed. CBC w/Diff Recent Labs  
  01/31/20 
0040 01/30/20 
0200 01/29/20 
0400 WBC 15.2* 15.2* 16.1*  
RBC 3.96* 3.49* 3.65* HGB 12.6* 10.9* 11.5* HCT 39.7 35.7* 38.2  235 232 GRANS 84* 85* 84* LYMPH 8* 8* 8*  
EOS 2 1 2 Cardiac Enzymes No results found for: CPK, CK, CKMMB, CKMB, RCK3, CKMBT, CKNDX, CKND1, JAXON, TROPT, TROIQ, LYDIA, TROPT, TNIPOC, BNP, BNPP Coagulation No results for input(s): PTP, INR, APTT, INREXT, INREXT in the last 72 hours. Lipid Panel No results found for: CHOL, CHOLPOCT, CHOLX, CHLST, CHOLV, 712403, HDL, HDLP, LDL, LDLC, DLDLP, 397976, VLDLC, VLDL, TGLX, TRIGL, TRIGP, TGLPOCT, CHHD, CHHDX  
BNP No results found for: BNP, BNPP, XBNPT Liver Enzymes No results for input(s): TP, ALB, TBIL, AP, SGOT, GPT in the last 72 hours. No lab exists for component: DBIL Digoxin Thyroid Studies No results found for: T4, T3U, TSH, TSHEXT, TSHEXT Signed By: Gala Monsalve MD   
 January 31, 2020

## 2020-01-31 NOTE — DISCHARGE SUMMARY
Discharge Summary Patient: Shannen Navarro III               Sex: male          DOA: 1/27/2020 YOB: 1940      Age:  78 y.o.        LOS:  LOS: 4 days Admit Date: 1/27/2020 Discharge Date: 1/31/2020 Admission Diagnoses: Sepsis ([facility] [A41.9] [facility] [A41.9] Atrial fibrillation with RVR ([facility] [I48.91] Discharge Diagnoses:   
Problem List as of 1/31/2020 Date Reviewed: 1/27/2020 Codes Class Noted - Resolved * (Principal) Sepsis ([facility] ICD-10-CM: A41.9 ICD-9-CM: 038.9, 995.91  1/27/2020 - Present UTI (urinary tract infection) ICD-10-CM: N39.0 ICD-9-CM: 599.0  1/27/2020 - Present Metabolic encephalopathy LGF-26-VN: G93.41 
ICD-9-CM: 348.31  1/29/2020 - Present Overview Signed 1/29/2020  6:56 AM by Esperanza Lozano MD  
  In the setting of dementia Atrial fibrillation with RVR (HCC) ICD-10-CM: I48.91 
ICD-9-CM: 427.31  1/27/2020 - Present Anxiety ICD-10-CM: F41.9 ICD-9-CM: 300.00  1/31/2020 - Present Primary osteoarthritis involving multiple joints ICD-10-CM: M15.0 ICD-9-CM: 715.09  1/31/2020 - Present Hypernatremia ICD-10-CM: E87.0 ICD-9-CM: 276.0  1/27/2020 - Present Acute cystitis ICD-10-CM: N30.00 ICD-9-CM: 595.0  1/27/2020 - Present Syncope ICD-10-CM: R55 
ICD-9-CM: 780.2  3/15/2017 - Present Essential hypertension ICD-10-CM: I10 
ICD-9-CM: 401.9  3/15/2017 - Present Dyslipidemia ICD-10-CM: E78.5 ICD-9-CM: 272.4  3/15/2017 - Present Dementia ([facility] ICD-10-CM: F03.90 ICD-9-CM: 294.20  3/15/2017 - Present Discharge Condition:  Improved Hospital Course:  Mr Yamel Rivera is an 78year old male who presented to the ER with complaints of change in his behavior and speech. In the ER he was found to have atrial fibrillation with rapid ventricular response and also bacteremia.   He was begun on antibiotics and IV cardizem and he was admitted for ongoing management. Mr Marybeth Levi improved with IV antibiotics and IV Cardizem. Cardiology was consulted who also assisted with his medical management. We had extensive conversations with his wife. The patient has been diagnosed with likely Lewy-Body Dementia and she is concerned that he has sacrificed all of his quality of life. After discussing management with his two sons, she made the decision to go in Hospice direction. His IV's have been discontinued as well as IV medication. He has failed his swallowing evaluation. His wife is highly concerned regarding the potential for aspiration, feeling this would even further diminish his quality of life. He will not utilize any sort of feeding moving forward. We will arrange for transfer back to [facility]. Consults: Cardiology Consulting Provider: Devin Gunter MD 
 
Significant Diagnostic Studies:  
 
Discharge Medications:    
Current Discharge Medication List  
  
START taking these medications Details LORazepam (INTENSOL) 2 mg/mL concentrated solution Take 0.5 mL by mouth every six (6) hours as needed for Anxiety. Max Daily Amount: 4 mg. Qty: 30 mL, Refills: 0 Associated Diagnoses: Anxiety  
  
morphine (ROXANOL) 100 mg/5 mL (20 mg/mL) concentrated solution Take 0.5 mL by mouth every one (1) hour as needed for Pain for up to 3 days. Max Daily Amount: 240 mg. 
Qty: 30 mL, Refills: 0 Associated Diagnoses: Primary osteoarthritis involving multiple joints CONTINUE these medications which have CHANGED Details  
artificial tears,hypromellose, (SYSTANE GEL) 0.3 % gel ophthalmic ointment Administer 1 g to both eyes four (4) times daily. 1 strip Qty: 10 g, Refills: 0  
  
ketotifen (ZADITOR) 0.025 % (0.035 %) ophthalmic solution Administer 1 Drop to both eyes two (2) times a day. Qty: 10 mL, Refills: 0  
  
melatonin 5 mg cap capsule Take 2 Caps by mouth nightly. dissolvable tabs Qty: 10 Cap, Refills: 0 STOP taking these medications  
  
 midodrine (PROAMITINE) 2.5 mg tablet Comments:  
Reason for Stopping:   
   
 Comp. Stocking,Thigh,Long,Large misc Comments:  
Reason for Stopping: QUEtiapine (SEROQUEL) 25 mg tablet Comments:  
Reason for Stopping:   
   
 rivastigmine (EXELON) 13.3 mg/24 hour patch Comments:  
Reason for Stopping:   
   
 triamcinolone (NASACORT) 55 mcg nasal inhaler Comments:  
Reason for Stopping:   
   
  
 
 
Activity: Bedrest 
 
Diet: NPO Wound Care: None needed Follow-up: Follow up with staff MD on arrival. 
Total Discharge time 35 minutes

## 2020-01-31 NOTE — HOSPICE
The patient's wife spoke with hospice nurse last night. Agreeable to hospice services. Call placed, no answer. Per CM wife was going to the airport to pick-up family member. Please notify [phone] of transport time.

## 2020-01-31 NOTE — PROGRESS NOTES
Problem: Falls - Risk of 
Goal: *Absence of Falls Description Document Nithin Reas Fall Risk and appropriate interventions in the flowsheet. Outcome: Progressing Towards Goal 
Note: Fall Risk Interventions: 
Mobility Interventions: Bed/chair exit alarm Mentation Interventions: Evaluate medications/consider consulting pharmacy Medication Interventions: Evaluate medications/consider consulting pharmacy Elimination Interventions: Bed/chair exit alarm, Call light in reach Problem: Pressure Injury - Risk of 
Goal: *Prevention of pressure injury Description Document Koby Scale and appropriate interventions in the flowsheet. Outcome: Progressing Towards Goal 
Note: Pressure Injury Interventions: 
Sensory Interventions: Assess changes in LOC Moisture Interventions: Apply protective barrier, creams and emollients Activity Interventions: Pressure redistribution bed/mattress(bed type) Mobility Interventions: HOB 30 degrees or less Nutrition Interventions: Document food/fluid/supplement intake Friction and Shear Interventions: HOB 30 degrees or less Problem: General Medical Care Plan Goal: *Vital signs within specified parameters Outcome: Progressing Towards Goal 
Goal: *Labs within defined limits Outcome: Progressing Towards Goal 
Goal: *Absence of infection signs and symptoms Outcome: Progressing Towards Goal

## 2020-01-31 NOTE — PROGRESS NOTES
0703: Bedside and verbal report received from [facility]. Pt resting quietly in bed, no distress noted. Care assumed, will monitor. 0815: Dr Rolando Drew at the bedside, no new orders at this time. 1030: Dr Nina Lemus at the bedside, discussed Hospice plan with Pt's son. Plan to discharge today to previous facility. Son verbalizes understanding. 1435: [place] [facility] to [facility] Jailene Sierra RN, transport planned for 1630.  
1648: Medical transport here for Pt, all patients belonging sent, IVs removed, condom cath removed.

## 2020-01-31 NOTE — PROGRESS NOTES
Assumed care of patient. Alert and oriented to self. Limited command following. PIV x2. cardizem infusing @ 10

## 2020-01-31 NOTE — PROGRESS NOTES
Discharge/Transition Planning Hospice consult placed late yesterday afternoon. [address] and they stated they talked to wife yesterday evening and she was agreeable to [facility] and had Hospital bed in place there, just waiting to see if can go back to [facility]. This is not documented in chart and notified them of this [address] and spoke with supervisor , BODØ and she stated this should not be any problem but she will speak with director just to verify and call back 1120: Spoke with [facility] and they are fine with pt coming back with Hospice today and she will be here till 7pm. Notified Dr Grover Eisenberg. Called wife and left voicemail. Notified Hospice 1145: Called [facility] and set transport for 1630. Pt RN will fax prescriptions to [facility] to be filled 1300: Notified wife of transport time. Notified [facility]. Attempted calling [facility] 2 times to speak with supervisor and she is on break. Faxed SNF report over to them . RN Vicky Smith aware to call report to [employer] at [phone]' [facility] & [address] Provider list has been given to the patient and/or patient representative. Patient and/or patient representative has signed the [place] of Choice selecting ___[address]______as their preference agency and a copy given. Both Home Health Provider list and Freedom of Choice have been placed on the chart. Care Management Interventions PCP Verified by CM: Yes(Sees Dr Cyrus Pickering) Last Visit to PCP: 12/19/19 Mode of Transport at Discharge: [facility] Hospital Transport Time of Discharge: [facility] Transition of Care Consult (CM Consult): Home Hospice, Assisted Living [facility]: Yes Current Support Network: Assisted Living Confirm Follow Up Transport: Other (see comment) The Patient and/or Patient Representative was Provided with a Choice of Provider and Agrees with the Discharge Plan?: Yes 
 Freedom of Choice List was Provided with Basic Dialogue that Supports the Patient's Individualized Plan of Care/Goals, Treatment Preferences and Shares the Quality Data Associated with the Providers?: Yes Discharge Location Discharge Placement: Assisted Living(with Hospice) Tanisha Couch RN BSN Outcomes Manager Pager # [phone]

## 2020-01-31 NOTE — PROGRESS NOTES
Problem: Discharge Planning Goal: *Discharge to safe environment Outcome: Progressing Towards Goal 
  
Problem: Falls - Risk of 
Goal: *Absence of Falls Description Document [place] [facility] Fall Risk and appropriate interventions in the flowsheet. Outcome: Progressing Towards Goal 
Note: Fall Risk Interventions: 
Mobility Interventions: Bed/chair exit alarm Mentation Interventions: Evaluate medications/consider consulting pharmacy Medication Interventions: Evaluate medications/consider consulting pharmacy Elimination Interventions: Bed/chair exit alarm, Call light in reach Problem: Patient Education: Go to Patient Education Activity Goal: Patient/Family Education Outcome: Progressing Towards Goal 
  
Problem: Pressure Injury - Risk of 
Goal: *Prevention of pressure injury Description Document Koby Scale and appropriate interventions in the flowsheet. Outcome: Progressing Towards Goal 
Note: Pressure Injury Interventions: 
Sensory Interventions: Assess changes in LOC Moisture Interventions: Apply protective barrier, creams and emollients Activity Interventions: Pressure redistribution bed/mattress(bed type) Mobility Interventions: HOB 30 degrees or less Nutrition Interventions: Document food/fluid/supplement intake Friction and Shear Interventions: HOB 30 degrees or less Problem: Patient Education: Go to Patient Education Activity Goal: Patient/Family Education Outcome: Progressing Towards Goal 
  
Problem: General Medical Care Plan Goal: *Vital signs within specified parameters Outcome: Progressing Towards Goal 
Goal: *Labs within defined limits Outcome: Progressing Towards Goal 
Goal: *Absence of infection signs and symptoms Outcome: Progressing Towards Goal 
Goal: *Optimal pain control at patient's stated goal 
Outcome: Progressing Towards Goal 
Goal: *Skin integrity maintained Outcome: Progressing Towards Goal 
Goal: *Fluid volume balance Outcome: Progressing Towards Goal 
Goal: *Optimize nutritional status Outcome: Progressing Towards Goal 
Goal: *Anxiety reduced or absent Outcome: Progressing Towards Goal 
Goal: *Progressive mobility and function (eg: ADL's) Outcome: Progressing Towards Goal 
  
Problem: Patient Education: Go to Patient Education Activity Goal: Patient/Family Education Outcome: Progressing Towards Goal 
  
Problem: Nutrition Deficit Goal: *Optimize nutritional status Outcome: Progressing Towards Goal 
  
Problem: Patient Education: Go to Patient Education Activity Goal: Patient/Family Education Outcome: Progressing Towards Goal 
  
Problem: Patient Education: Go to Patient Education Activity Goal: Patient/Family Education Outcome: Progressing Towards Goal 
  
Problem: Patient Education: Go to Patient Education Activity Goal: Patient/Family Education Outcome: Progressing Towards Goal

## 2020-01-31 NOTE — PROGRESS NOTES
Patient remains unable to communicate at this time due to his current condition. No family seen at this visit.  offered prayer and left Spiritual Care brochure. Chaplains will continue to follow and will provide pastoral care on an as needed/requested basis. Kaitlyn Laura Board Certified [address] [place] [address] Spiritual Care  
[phone]

## 2020-01-31 NOTE — PROGRESS NOTES
Per nursing, pt being discharged back to [place] [id number] with [facility], nursing request to withhold tx.

## 2020-01-31 NOTE — HOSPICE
Spoke with patient's wife-reviewed hospice care. Wife reports that patient will likely go back to [facility] if accepted by them. Wife reports that patient has a hospital bed already at facility. Wife states she will be at hospital in the morning. Hospice liaison to f/u in AM.

## 2020-01-31 NOTE — PROGRESS NOTES
Problem: Dysphagia (Adult) Goal: *Acute Goals and Plan of Care (Insert Text) Description Recommendations: 
Diet: NPO x ice chips only after thorough oral care vs comfort feeds of puree and thin liquids Meds: via IV Aspiration Precautions Oral Care TID Goals:  Patient will: 1. Tolerate diet and/or diet upgrade without overt s/sx of aspiration under SLP supervision 2. Utilize compensatory swallow strategies of small bite/sip, alternate liquid/solid with min cues 3. Perform oral-motor and laryngeal elevation exercises 10 reps/each to increase oropharyngeal swallow function with min cues 4. Complete an objective swallow study (i.e., MBSS) to assess swallow integrity, r/o aspiration, and determine of safest LRD, min A Outcome: Not Progressing Towards Goal 
  
SPEECH LANGUAGE PATHOLOGY DYSPHAGIA TREATMENT Patient: Kiera Adams III (28 y.o. male) Date: 1/31/2020 Diagnosis: Sepsis ([address] [A41.9] Sepsis ([address] [A41.9] Atrial fibrillation with RVR ([address] [I48.91] Sepsis ([address] Precautions: aspiration Fall PLOF:per h&P  
 
ASSESSMENT: 
Patient seen for swallowing therapy this day with son at bedside. The patient continues to present confused, oriented to self. Patient with wet vocal quality, unable to clear secretions from pharynx/larynx. SLP provided ice chip trials, with coughing after 2 trials. The patient presents as a HIGH risk for aspiration despite diet modification. Discussed MBS results with son of : \"MBS completed with blake aspiration across all study trials, to include: thin +/- straw; nectar-thick +/- straw; pudding. All aspiration incidents occurred at time of, and after swallow on residuals. Honey-thick liquids not attempted as pt with increased difficulty clearing residuals with thicker viscosity trials.  Deficits included labored oral bolus prep and transit with premature spillage into valleculae and pyriforms; swallow delays of >45 seconds; decreased hyolaryngeal excursion with no epiglottic inversion. Thus resulting in airway compromise and large volume residuals in valleculae, pyriforms, and posterior pharyngeal wall. Pt with decreased laryngeal sensation and is unable to produce multiple swallows to clear residuals despite max stimulation to trigger swallow. \" Son verbalized comprehension, continues to decline feeding tube. At this time, SLP recommends comfort feeds of puree and thin liquids, as patient is having increased difficulty clearing thicker liquid from pharynx, as all consistencies are being aspirated. Dicussed with RN, nutrition, and son. Progression toward goals: 
[]         Improving appropriately and progressing toward goals [x]         Improving slowly and progressing toward goals 
[]         Not making progress toward goals and plan of care will be adjusted PLAN: 
Recommendations and Planned Interventions: 
Comfort feeds of puree and thin liquids Patient continues to benefit from skilled intervention to address the above impairments. Continue treatment per established plan of care. Discharge Recommendations:  Home Health SUBJECTIVE:  
Patient stated Cyrus. OBJECTIVE:  
Cognitive and Communication Status: 
Neurologic State: Alert, Confused Orientation Level: Oriented to person Cognition: Impaired decision making Perception: Appears intact Perseveration: No perseveration noted Safety/Judgement: Decreased insight into deficits Dysphagia Treatment: 
Oral Assessment: 
Oral Assessment Labial: Decreased rate, Decreased seal 
Dentition: Intact Oral Hygiene: good Lingual: Decreased rate, Decreased strength Velum: No impairment Mandible: No impairment P.O. Trials: 
 Patient Position: [id number] Vocal quality prior to P.O.: Wet Consistency Presented: Ice chips How Presented: Sharon Rosa How Much: 3 Bolus Acceptance: No impairment Bolus Formation/Control: Impaired Type of Impairment: Premature spillage Propulsion: No impairment Oral Residue: None Initiation of Swallow: Delayed (# of seconds) Laryngeal Elevation: Weak Aspiration Signs/Symptoms: Weak cough Pharyngeal Phase Characteristics: Poor endurance Effective Modifications: None Cues for Modifications: Moderate Oral Phase Severity: Moderate Pharyngeal Phase Severity : Severe Oral Motor Exercises: PAIN: 
Pain level pre-treatment: 0/10 Pain level post-treatment: 0/10 Pain Intervention(s): Medication (see MAR); Rest, Ice, Repositioning Response to intervention: Nurse notified, See doc flow After treatment:  
[]              Patient left in no apparent distress sitting up in chair 
[x]              Patient left in no apparent distress in bed 
[]              Call bell left within reach [x]              Nursing notified 
[]              Family present 
[]              Caregiver present 
[]              Bed alarm activated COMMUNICATION/EDUCATION:  
[x] Aspiration precautions; swallow safety; compensatory techniques [x]        Patient unable to participate in education; education ongoing with staff 
[]  Posted safety precautions in patient's room. [] Oral-motor/laryngeal strengthening exercises [facility] SLP Time Calculation: 19 mins

## 2020-02-07 ENCOUNTER — HOME CARE VISIT (OUTPATIENT)
Dept: HOME HEALTH SERVICES | Facility: HOME HEALTH | Age: 80
End: 2020-02-07
Payer: MEDICARE

## 2020-02-11 LAB
BACTERIA SPEC CULT: ABNORMAL
GRAM STN SPEC: ABNORMAL
SERVICE CMNT-IMP: ABNORMAL

## 2022-05-04 NOTE — ED TRIAGE NOTES
Goal Outcome Evaluation:           Progress: improving  Outcome Evaluation: A&Ox4. RA. Appetite poor. SCDs in place. IV antibiotics completed today. Worked with PT. No complaints this shift. VSS, will continue to monitor.   Per EMS-\"Patient with altered mental status since Saturday per staff. Patient is oriented to person. Patient is a DNR. \"